# Patient Record
Sex: FEMALE | Race: WHITE | NOT HISPANIC OR LATINO | URBAN - METROPOLITAN AREA
[De-identification: names, ages, dates, MRNs, and addresses within clinical notes are randomized per-mention and may not be internally consistent; named-entity substitution may affect disease eponyms.]

---

## 2017-02-20 ENCOUNTER — INPATIENT (INPATIENT)
Facility: HOSPITAL | Age: 62
LOS: 4 days | Discharge: HOME | End: 2017-02-25
Attending: HOSPITALIST

## 2017-06-08 ENCOUNTER — INPATIENT (INPATIENT)
Facility: HOSPITAL | Age: 62
LOS: 8 days | Discharge: HOME | End: 2017-06-17

## 2017-06-08 DIAGNOSIS — R06.02 SHORTNESS OF BREATH: ICD-10-CM

## 2017-06-08 DIAGNOSIS — T50.901A POISONING BY UNSPECIFIED DRUGS, MEDICAMENTS AND BIOLOGICAL SUBSTANCES, ACCIDENTAL (UNINTENTIONAL), INITIAL ENCOUNTER: ICD-10-CM

## 2017-06-08 DIAGNOSIS — M62.82 RHABDOMYOLYSIS: ICD-10-CM

## 2017-06-08 DIAGNOSIS — R09.2 RESPIRATORY ARREST: ICD-10-CM

## 2017-06-28 DIAGNOSIS — N17.9 ACUTE KIDNEY FAILURE, UNSPECIFIED: ICD-10-CM

## 2017-06-28 DIAGNOSIS — F13.239 SEDATIVE, HYPNOTIC OR ANXIOLYTIC DEPENDENCE WITH WITHDRAWAL, UNSPECIFIED: ICD-10-CM

## 2017-06-28 DIAGNOSIS — T42.6X1A POISONING BY OTHER ANTIEPILEPTIC AND SEDATIVE-HYPNOTIC DRUGS, ACCIDENTAL (UNINTENTIONAL), INITIAL ENCOUNTER: ICD-10-CM

## 2017-06-28 DIAGNOSIS — F15.93 OTHER STIMULANT USE, UNSPECIFIED WITH WITHDRAWAL: ICD-10-CM

## 2017-06-28 DIAGNOSIS — J15.8 PNEUMONIA DUE TO OTHER SPECIFIED BACTERIA: ICD-10-CM

## 2017-06-28 DIAGNOSIS — F17.210 NICOTINE DEPENDENCE, CIGARETTES, UNCOMPLICATED: ICD-10-CM

## 2017-06-28 DIAGNOSIS — T39.1X1A POISONING BY 4-AMINOPHENOL DERIVATIVES, ACCIDENTAL (UNINTENTIONAL), INITIAL ENCOUNTER: ICD-10-CM

## 2017-06-28 DIAGNOSIS — I10 ESSENTIAL (PRIMARY) HYPERTENSION: ICD-10-CM

## 2017-06-28 DIAGNOSIS — M62.82 RHABDOMYOLYSIS: ICD-10-CM

## 2017-06-28 DIAGNOSIS — T65.91XA TOXIC EFFECT OF UNSPECIFIED SUBSTANCE, ACCIDENTAL (UNINTENTIONAL), INITIAL ENCOUNTER: ICD-10-CM

## 2017-06-28 DIAGNOSIS — M19.90 UNSPECIFIED OSTEOARTHRITIS, UNSPECIFIED SITE: ICD-10-CM

## 2017-06-28 DIAGNOSIS — J69.0 PNEUMONITIS DUE TO INHALATION OF FOOD AND VOMIT: ICD-10-CM

## 2017-06-28 DIAGNOSIS — J96.00 ACUTE RESPIRATORY FAILURE, UNSPECIFIED WHETHER WITH HYPOXIA OR HYPERCAPNIA: ICD-10-CM

## 2017-06-28 DIAGNOSIS — R56.9 UNSPECIFIED CONVULSIONS: ICD-10-CM
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
## 2017-06-28 DIAGNOSIS — T42.4X1A POISONING BY BENZODIAZEPINES, ACCIDENTAL (UNINTENTIONAL), INITIAL ENCOUNTER: ICD-10-CM

## 2017-06-28 DIAGNOSIS — E87.6 HYPOKALEMIA: ICD-10-CM

## 2017-09-04 ENCOUNTER — EMERGENCY (EMERGENCY)
Facility: HOSPITAL | Age: 62
LOS: 0 days | Discharge: HOME | End: 2017-09-04

## 2017-09-04 DIAGNOSIS — Z79.899 OTHER LONG TERM (CURRENT) DRUG THERAPY: ICD-10-CM
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
## 2017-09-04 DIAGNOSIS — S00.83XA CONTUSION OF OTHER PART OF HEAD, INITIAL ENCOUNTER: ICD-10-CM

## 2017-09-04 DIAGNOSIS — I10 ESSENTIAL (PRIMARY) HYPERTENSION: ICD-10-CM

## 2017-09-04 DIAGNOSIS — S30.1XXA CONTUSION OF ABDOMINAL WALL, INITIAL ENCOUNTER: ICD-10-CM
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
## 2017-09-04 DIAGNOSIS — M62.82 RHABDOMYOLYSIS: ICD-10-CM
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
## 2017-09-04 DIAGNOSIS — S20.212A CONTUSION OF LEFT FRONT WALL OF THORAX, INITIAL ENCOUNTER: ICD-10-CM

## 2017-09-04 DIAGNOSIS — R06.02 SHORTNESS OF BREATH: ICD-10-CM

## 2017-09-04 DIAGNOSIS — R09.2 RESPIRATORY ARREST: ICD-10-CM

## 2017-09-04 DIAGNOSIS — T50.901A POISONING BY UNSPECIFIED DRUGS, MEDICAMENTS AND BIOLOGICAL SUBSTANCES, ACCIDENTAL (UNINTENTIONAL), INITIAL ENCOUNTER: ICD-10-CM

## 2017-09-07 ENCOUNTER — EMERGENCY (EMERGENCY)
Facility: HOSPITAL | Age: 62
LOS: 0 days | Discharge: HOME | End: 2017-09-07

## 2017-09-07 DIAGNOSIS — Z79.899 OTHER LONG TERM (CURRENT) DRUG THERAPY: ICD-10-CM

## 2017-09-07 DIAGNOSIS — T50.901A POISONING BY UNSPECIFIED DRUGS, MEDICAMENTS AND BIOLOGICAL SUBSTANCES, ACCIDENTAL (UNINTENTIONAL), INITIAL ENCOUNTER: ICD-10-CM

## 2017-09-07 DIAGNOSIS — M62.82 RHABDOMYOLYSIS: ICD-10-CM

## 2017-09-07 DIAGNOSIS — R06.02 SHORTNESS OF BREATH: ICD-10-CM

## 2017-09-07 DIAGNOSIS — I10 ESSENTIAL (PRIMARY) HYPERTENSION: ICD-10-CM

## 2017-09-07 DIAGNOSIS — R10.30 LOWER ABDOMINAL PAIN, UNSPECIFIED: ICD-10-CM

## 2017-09-07 DIAGNOSIS — K57.92 DIVERTICULITIS OF INTESTINE, PART UNSPECIFIED, WITHOUT PERFORATION OR ABSCESS WITHOUT BLEEDING: ICD-10-CM

## 2017-09-07 DIAGNOSIS — Z90.49 ACQUIRED ABSENCE OF OTHER SPECIFIED PARTS OF DIGESTIVE TRACT: ICD-10-CM

## 2017-09-07 DIAGNOSIS — R09.2 RESPIRATORY ARREST: ICD-10-CM

## 2017-09-13 ENCOUNTER — EMERGENCY (EMERGENCY)
Facility: HOSPITAL | Age: 62
LOS: 0 days | Discharge: HOME | End: 2017-09-13

## 2017-09-13 DIAGNOSIS — R56.9 UNSPECIFIED CONVULSIONS: ICD-10-CM
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
## 2017-09-13 DIAGNOSIS — Z79.899 OTHER LONG TERM (CURRENT) DRUG THERAPY: ICD-10-CM

## 2017-09-13 DIAGNOSIS — R09.2 RESPIRATORY ARREST: ICD-10-CM

## 2017-09-13 DIAGNOSIS — M62.82 RHABDOMYOLYSIS: ICD-10-CM

## 2017-09-13 DIAGNOSIS — I10 ESSENTIAL (PRIMARY) HYPERTENSION: ICD-10-CM

## 2017-09-13 DIAGNOSIS — T50.901A POISONING BY UNSPECIFIED DRUGS, MEDICAMENTS AND BIOLOGICAL SUBSTANCES, ACCIDENTAL (UNINTENTIONAL), INITIAL ENCOUNTER: ICD-10-CM

## 2017-09-13 DIAGNOSIS — S22.42XA MULTIPLE FRACTURES OF RIBS, LEFT SIDE, INITIAL ENCOUNTER FOR CLOSED FRACTURE: ICD-10-CM

## 2017-09-13 DIAGNOSIS — R06.02 SHORTNESS OF BREATH: ICD-10-CM
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
## 2017-09-13 DIAGNOSIS — Z90.49 ACQUIRED ABSENCE OF OTHER SPECIFIED PARTS OF DIGESTIVE TRACT: ICD-10-CM

## 2017-09-13 DIAGNOSIS — D72.829 ELEVATED WHITE BLOOD CELL COUNT, UNSPECIFIED: ICD-10-CM

## 2017-09-13 DIAGNOSIS — R07.81 PLEURODYNIA: ICD-10-CM

## 2017-10-05 DIAGNOSIS — S42.102A FRACTURE OF UNSPECIFIED PART OF SCAPULA, LEFT SHOULDER, INITIAL ENCOUNTER FOR CLOSED FRACTURE: ICD-10-CM
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
## 2017-10-05 DIAGNOSIS — F14.10 COCAINE ABUSE, UNCOMPLICATED: ICD-10-CM
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
## 2017-10-05 DIAGNOSIS — R56.9 UNSPECIFIED CONVULSIONS: ICD-10-CM

## 2017-10-05 DIAGNOSIS — R91.1 SOLITARY PULMONARY NODULE: ICD-10-CM
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
## 2017-10-05 DIAGNOSIS — M62.82 RHABDOMYOLYSIS: ICD-10-CM

## 2017-10-05 DIAGNOSIS — R06.02 SHORTNESS OF BREATH: ICD-10-CM

## 2018-02-27 ENCOUNTER — APPOINTMENT (OUTPATIENT)
Dept: HEART AND VASCULAR | Facility: CLINIC | Age: 63
End: 2018-02-27
Payer: COMMERCIAL

## 2018-02-27 VITALS — HEART RATE: 68 BPM

## 2018-02-27 VITALS — DIASTOLIC BLOOD PRESSURE: 86 MMHG | SYSTOLIC BLOOD PRESSURE: 130 MMHG

## 2018-02-27 VITALS — WEIGHT: 176 LBS | HEIGHT: 67 IN | BODY MASS INDEX: 27.62 KG/M2

## 2018-02-27 DIAGNOSIS — I10 ESSENTIAL (PRIMARY) HYPERTENSION: ICD-10-CM

## 2018-02-27 DIAGNOSIS — Z82.49 FAMILY HISTORY OF ISCHEMIC HEART DISEASE AND OTHER DISEASES OF THE CIRCULATORY SYSTEM: ICD-10-CM

## 2018-02-27 DIAGNOSIS — R06.02 SHORTNESS OF BREATH: ICD-10-CM

## 2018-02-27 DIAGNOSIS — I36.1 NONRHEUMATIC TRICUSPID (VALVE) INSUFFICIENCY: ICD-10-CM

## 2018-02-27 PROCEDURE — 99244 OFF/OP CNSLTJ NEW/EST MOD 40: CPT | Mod: 25

## 2018-02-27 PROCEDURE — 93000 ELECTROCARDIOGRAM COMPLETE: CPT

## 2018-02-27 PROCEDURE — 36415 COLL VENOUS BLD VENIPUNCTURE: CPT

## 2018-02-27 PROCEDURE — 93306 TTE W/DOPPLER COMPLETE: CPT

## 2018-02-27 RX ORDER — NEBIVOLOL HYDROCHLORIDE 20 MG/1
20 TABLET ORAL
Qty: 90 | Refills: 0 | Status: ACTIVE | COMMUNITY

## 2018-02-28 LAB — NT-PROBNP SERPL-MCNC: 75 PG/ML

## 2018-03-20 ENCOUNTER — APPOINTMENT (OUTPATIENT)
Dept: CT IMAGING | Facility: HOSPITAL | Age: 63
End: 2018-03-20

## 2018-05-21 ENCOUNTER — RX RENEWAL (OUTPATIENT)
Age: 63
End: 2018-05-21

## 2018-06-05 ENCOUNTER — INPATIENT (INPATIENT)
Facility: HOSPITAL | Age: 63
LOS: 1 days | Discharge: HOME | End: 2018-06-07
Attending: HOSPITALIST | Admitting: HOSPITALIST

## 2018-06-05 VITALS
TEMPERATURE: 97 F | HEART RATE: 84 BPM | DIASTOLIC BLOOD PRESSURE: 68 MMHG | SYSTOLIC BLOOD PRESSURE: 143 MMHG | RESPIRATION RATE: 16 BRPM

## 2018-06-05 DIAGNOSIS — Z98.82 BREAST IMPLANT STATUS: Chronic | ICD-10-CM

## 2018-06-05 DIAGNOSIS — R56.9 UNSPECIFIED CONVULSIONS: ICD-10-CM

## 2018-06-05 DIAGNOSIS — S22.39XA FRACTURE OF ONE RIB, UNSPECIFIED SIDE, INITIAL ENCOUNTER FOR CLOSED FRACTURE: Chronic | ICD-10-CM

## 2018-06-05 DIAGNOSIS — Z90.49 ACQUIRED ABSENCE OF OTHER SPECIFIED PARTS OF DIGESTIVE TRACT: Chronic | ICD-10-CM

## 2018-06-05 DIAGNOSIS — S07.1XXS: Chronic | ICD-10-CM

## 2018-06-05 DIAGNOSIS — S22.39XA FRACTURE OF ONE RIB, UNSPECIFIED SIDE, INITIAL ENCOUNTER FOR CLOSED FRACTURE: ICD-10-CM

## 2018-06-05 DIAGNOSIS — I10 ESSENTIAL (PRIMARY) HYPERTENSION: ICD-10-CM

## 2018-06-05 LAB
ALBUMIN SERPL ELPH-MCNC: 4.1 G/DL — SIGNIFICANT CHANGE UP (ref 3.5–5.2)
ALBUMIN SERPL ELPH-MCNC: 4.2 G/DL — SIGNIFICANT CHANGE UP (ref 3.5–5.2)
ALP SERPL-CCNC: 54 U/L — SIGNIFICANT CHANGE UP (ref 30–115)
ALP SERPL-CCNC: 55 U/L — SIGNIFICANT CHANGE UP (ref 30–115)
ALT FLD-CCNC: 36 U/L — SIGNIFICANT CHANGE UP (ref 0–41)
ALT FLD-CCNC: 37 U/L — SIGNIFICANT CHANGE UP (ref 0–41)
ANION GAP SERPL CALC-SCNC: 13 MMOL/L — SIGNIFICANT CHANGE UP (ref 7–14)
ANION GAP SERPL CALC-SCNC: 13 MMOL/L — SIGNIFICANT CHANGE UP (ref 7–14)
AST SERPL-CCNC: 28 U/L — SIGNIFICANT CHANGE UP (ref 0–41)
AST SERPL-CCNC: 28 U/L — SIGNIFICANT CHANGE UP (ref 0–41)
BILIRUB SERPL-MCNC: 0.4 MG/DL — SIGNIFICANT CHANGE UP (ref 0.2–1.2)
BILIRUB SERPL-MCNC: 0.5 MG/DL — SIGNIFICANT CHANGE UP (ref 0.2–1.2)
BUN SERPL-MCNC: 10 MG/DL — SIGNIFICANT CHANGE UP (ref 10–20)
BUN SERPL-MCNC: 9 MG/DL — LOW (ref 10–20)
CALCIUM SERPL-MCNC: 10 MG/DL — SIGNIFICANT CHANGE UP (ref 8.5–10.1)
CALCIUM SERPL-MCNC: 9.3 MG/DL — SIGNIFICANT CHANGE UP (ref 8.5–10.1)
CHLORIDE SERPL-SCNC: 104 MMOL/L — SIGNIFICANT CHANGE UP (ref 98–110)
CHLORIDE SERPL-SCNC: 104 MMOL/L — SIGNIFICANT CHANGE UP (ref 98–110)
CO2 SERPL-SCNC: 26 MMOL/L — SIGNIFICANT CHANGE UP (ref 17–32)
CO2 SERPL-SCNC: 27 MMOL/L — SIGNIFICANT CHANGE UP (ref 17–32)
CREAT SERPL-MCNC: 0.7 MG/DL — SIGNIFICANT CHANGE UP (ref 0.7–1.5)
CREAT SERPL-MCNC: 0.7 MG/DL — SIGNIFICANT CHANGE UP (ref 0.7–1.5)
GLUCOSE SERPL-MCNC: 101 MG/DL — HIGH (ref 70–99)
GLUCOSE SERPL-MCNC: 187 MG/DL — HIGH (ref 70–99)
HCT VFR BLD CALC: 41.7 % — SIGNIFICANT CHANGE UP (ref 37–47)
HGB BLD-MCNC: 15 G/DL — SIGNIFICANT CHANGE UP (ref 12–16)
MAGNESIUM SERPL-MCNC: 2.1 MG/DL — SIGNIFICANT CHANGE UP (ref 1.8–2.4)
MAGNESIUM SERPL-MCNC: 2.3 MG/DL — SIGNIFICANT CHANGE UP (ref 1.8–2.4)
MCHC RBC-ENTMCNC: 36 G/DL — SIGNIFICANT CHANGE UP (ref 32–37)
MCHC RBC-ENTMCNC: 36.1 PG — HIGH (ref 27–31)
MCV RBC AUTO: 100.5 FL — HIGH (ref 81–99)
NRBC # BLD: 0 /100 WBCS — SIGNIFICANT CHANGE UP (ref 0–0)
PLATELET # BLD AUTO: 156 K/UL — SIGNIFICANT CHANGE UP (ref 130–400)
POTASSIUM SERPL-MCNC: 3.8 MMOL/L — SIGNIFICANT CHANGE UP (ref 3.5–5)
POTASSIUM SERPL-MCNC: 4.3 MMOL/L — SIGNIFICANT CHANGE UP (ref 3.5–5)
POTASSIUM SERPL-SCNC: 3.8 MMOL/L — SIGNIFICANT CHANGE UP (ref 3.5–5)
POTASSIUM SERPL-SCNC: 4.3 MMOL/L — SIGNIFICANT CHANGE UP (ref 3.5–5)
PROT SERPL-MCNC: 6.5 G/DL — SIGNIFICANT CHANGE UP (ref 6–8)
PROT SERPL-MCNC: 6.6 G/DL — SIGNIFICANT CHANGE UP (ref 6–8)
RBC # BLD: 4.15 M/UL — LOW (ref 4.2–5.4)
RBC # FLD: 13.2 % — SIGNIFICANT CHANGE UP (ref 11.5–14.5)
SODIUM SERPL-SCNC: 143 MMOL/L — SIGNIFICANT CHANGE UP (ref 135–146)
SODIUM SERPL-SCNC: 144 MMOL/L — SIGNIFICANT CHANGE UP (ref 135–146)
WBC # BLD: 6.74 K/UL — SIGNIFICANT CHANGE UP (ref 4.8–10.8)
WBC # FLD AUTO: 6.74 K/UL — SIGNIFICANT CHANGE UP (ref 4.8–10.8)

## 2018-06-05 RX ORDER — ENOXAPARIN SODIUM 100 MG/ML
40 INJECTION SUBCUTANEOUS EVERY 24 HOURS
Qty: 0 | Refills: 0 | Status: DISCONTINUED | OUTPATIENT
Start: 2018-06-05 | End: 2018-06-07

## 2018-06-05 RX ORDER — TEMAZEPAM 15 MG/1
15 CAPSULE ORAL AT BEDTIME
Qty: 0 | Refills: 0 | Status: DISCONTINUED | OUTPATIENT
Start: 2018-06-05 | End: 2018-06-07

## 2018-06-05 RX ORDER — AMLODIPINE BESYLATE 2.5 MG/1
5 TABLET ORAL DAILY
Qty: 0 | Refills: 0 | Status: DISCONTINUED | OUTPATIENT
Start: 2018-06-05 | End: 2018-06-07

## 2018-06-05 RX ORDER — LEVETIRACETAM 250 MG/1
750 TABLET, FILM COATED ORAL EVERY 12 HOURS
Qty: 0 | Refills: 0 | Status: DISCONTINUED | OUTPATIENT
Start: 2018-06-05 | End: 2018-06-06

## 2018-06-05 RX ORDER — FLUTICASONE PROPIONATE 50 MCG
2 SPRAY, SUSPENSION NASAL DAILY
Qty: 0 | Refills: 0 | Status: DISCONTINUED | OUTPATIENT
Start: 2018-06-05 | End: 2018-06-07

## 2018-06-05 RX ADMIN — LEVETIRACETAM 750 MILLIGRAM(S): 250 TABLET, FILM COATED ORAL at 22:44

## 2018-06-05 RX ADMIN — TEMAZEPAM 15 MILLIGRAM(S): 15 CAPSULE ORAL at 23:24

## 2018-06-05 NOTE — CONSULT NOTE ADULT - ATTENDING COMMENTS
Agree with the history and plan  She does have history of fall skull Fx on the left side.  with the potential for higher risk  she believes keppra is giving her side effects  she is here for risk assessment and to decide on the possible replacement for the keppra

## 2018-06-05 NOTE — H&P ADULT - ASSESSMENT
Patient with known history of head trauma and seizure was sent for direct admission for VEEG. She is seizure free for one year and takes Keppra 750 mg Q 12 hours. She wants to drive and to be tapered of Keppra. She denies any specific complaints at the time of admission. Sometimes she feels SOB after she suffered from rib fracture.   Patient was directly admitted to  for VEEG monitoring.

## 2018-06-05 NOTE — H&P ADULT - HISTORY OF PRESENT ILLNESS
Patient with known history of head trauma and seizure was sent for direct admission for VEEG. She is seizure free for one year and takes Keppra 750 mg Q 12 hours. She wants to drive and to be tapered of Keppra. She denies any specific complaints at the time of admission. Patient with known history of head trauma and seizure was sent for direct admission for VEEG. She is seizure free for one year and takes Keppra 750 mg Q 12 hours. She wants to drive and to be tapered of Keppra. She denies any specific complaints at the time of admission. Sometimes she feels SOB after she suffered from rib fracture. 61 y/o female  with known history of head trauma and seizure was sent for direct admission for VEEG. She is seizure free for one year and takes Keppra 750 mg Q 12 hours. She wants to drive and to be tapered of Keppra. She denies any specific complaints at the time of admission. Sometimes she feels SOB after she suffered from rib fracture. 63 y/o female  with known history of head trauma and seizure was sent for direct admission for VEEG. She is seizure free for one year and takes Keppra 750 mg Q 12 hours. She wants to drive and to be tapered off Keppra. Patient  denies any specific complaints at the time of admission. Sometimes she feels SOB after she suffered from rib fracture.

## 2018-06-05 NOTE — H&P ADULT - NSHPPHYSICALEXAM_GEN_ALL_CORE
PHYSICAL EXAM:      Constitutional: well build; good hygiene   Neck: supple ; no JVD   Breasts: b/l breast implants   Back: no paraspinal tenderness  Respiratory: CTA B/L   Cardiovascular: S1; S2  Gastrointestinal/GI: soft ; NT; ND; BS positive   Extremities: no edema ; pulses intact   Neurological: AAO x 3 ; no focal neurological deficient   Skin: dry skin ; no rash     Vital Signs Last 24 Hrs  T(C): 36.1 (05 Jun 2018 14:13), Max: 36.1 (05 Jun 2018 10:25)  T(F): 97 (05 Jun 2018 14:13), Max: 97 (05 Jun 2018 14:13)  HR: 88 (05 Jun 2018 14:13) (84 - 88)  BP: 141/83 (05 Jun 2018 14:13) (141/83 - 143/68)  BP(mean): --  RR: 16 (05 Jun 2018 14:13) (16 - 16)

## 2018-06-05 NOTE — CONSULT NOTE ADULT - SUBJECTIVE AND OBJECTIVE BOX
Neurology/Epilepsy Consult:    CORIE CHOI 62y Female  MRN-[id number]    Patient is a 62y old right-handed Female who presents for elective VEEG      HPI: History is obtained from patient and EMR.   Patient had 1st seizure in 2017. At that time she was admitted with left shoulder pain and rhabdomyolysis s/p seizure witnessed by spouse. Patient admitted to using cocaine a few days earlier, was not started on AED. In 2017 patient was found unresponsive at home, then had a seizure witnessed by EMT. Patient was intubated, admitted to ICU with rhabdomyolysis and aspiration pneumonia, started on Keppra. Patient denies any other events. Reports being compliant with Keppra, but c/o hair loss and significant bloating in the last year. Patient also wants to drive.  Patient c/o difficulty falling asleep/staying asleep. Was tried on Ambien but did not find it  effective. Patient uses Xanax 2mg at times, last use 2 days ago (not prescribed). States used Xanax 6 times in the last 3-4 weeks. Also, patient tripped and fell last week, and had left foot pain. Was using Vicodin as needed, last use 2 days ago (not prescribed). Patient reports last use of ETOH 2 days ago (2-3 glasses of wine). Patient has h/o alcohol abuse in the past. Lately uses ETOH rarely, and denies having more than 2 drinks every few weeks.     PAST MEDICAL & SURGICAL HISTORY:  Fall with skull fracture   Seizures  HTN  Osteopenia  Breast augmentation  Appendectomy  Polysubstance abuse      FAMILY HISTORY:  No seizures      Allergy:  No Known Allergies      Home Medications:  Keppra 750mg q12hrs  Norvasc 5mg q24hrs  Xanax 2mg (not prescribed) - last use 2 days ago  Vicodin (not prescribed0 - last use 2 days ago      MEDICATIONS  (STANDING):  enoxaparin Injectable 40 milliGRAM(s) SubCutaneous every 24 hours  levETIRAcetam 750 milliGRAM(s) Oral every 12 hours    MEDICATIONS  (PRN):  LORazepam   Injectable 2 milliGRAM(s) IV Push three times a day PRN generalized tonic-clonic seizure lasting longer than 2 minutes, or two consecutive seizures without return to baseline in-between      T(F): 97 (18 @ 14:13), Max: 97 (18 @ 14:13)  HR: 88 (18 @ 14:13) (84 - 88)  BP: 141/83 (18 @ 14:13) (141/83 - 143/68)  RR: 16 (18 @ 14:13) (16 - 16)  SpO2: --    Neurologic Examination:  General:  Appearance is consistent with chronologic age.  No abnormal facies.   General: The patient is oriented to person, place, time and date.  Follows 3-4-step directions. Slightly fidgety. Language with normal repetition, comprehension and naming.  Nondysarthric.    Cranial nerves: EOMI w/o nystagmus, skew or reported double vision.  PERRL.  No ptosis/weakness of eyelid closure.  Facial sensation is normal with normal bite.  No facial asymmetry.  Hearing grossly intact b/l.  Palate elevates midline.  Tongue midline.  Motor examination:   Normal tone, bulk and range of motion.  No tenderness, twitching, tremors or involuntary movements.  Formal Muscle Strength Testin/5 UE; 5/5 LE.  No observable drift.  Reflexes:   2+ b/l pectoralis, biceps, triceps, brachioradialis, patella and Achilles.  Plantar response downgoing b/l.    Sensory examination:   Intact to light touch and pinprick, pain.  Cerebellum:   FTN/HKS intact with normal JENAE in all limbs.  No dysmetria or dysdiadokinesia.  Gait narrow based and normal.      Labs:   2018 B12 505, Folate 17.2, Vit D 40  Keppra 21.0 mcg/ml (random level)      Neuroimaging:  [facility] 2017 - normal      Ambulatory EE2017 - small number of bilateral independent R>L mid-Temporal spikes and after going slow waves  VEEG x 48hrs 2017 - mild right hemispheric focal slowing, mainly over F-T region  VEEG x 48hrs 2017 borderline to mild L>R anterior quadrant focal slowing      Assessment:  This is a 62y Female with h/o skull Fx, seizures, osteopenia, remains clinically seizure-free for almost a year. Patient reports hair loss and bloating since starting Keppra.    Discussed with Dr. Cevallos    Plan:   - VEEG for characterization and assessment  - Seizure precautions  - Continue home dose of Keppra for now (ordered)  - Ativan 2mg IV PRN for generalized tonic-clonic seizure lasting longer than 2 minutes, or two consecutive seizures without return to baseline in-between (ordered)  - CBC, CMP, Mg, Keppra level trough, urine toxicology (ordered)  - Keep Mg above 2    Plan discussed with patient in details, all questions answered  18 @ 15:23

## 2018-06-06 DIAGNOSIS — R73.9 HYPERGLYCEMIA, UNSPECIFIED: ICD-10-CM

## 2018-06-06 LAB
AMPHET UR-MCNC: NEGATIVE — SIGNIFICANT CHANGE UP
BARBITURATES UR SCN-MCNC: NEGATIVE — SIGNIFICANT CHANGE UP
BENZODIAZ UR-MCNC: POSITIVE
COCAINE METAB.OTHER UR-MCNC: NEGATIVE — SIGNIFICANT CHANGE UP
METHADONE UR-MCNC: NEGATIVE — SIGNIFICANT CHANGE UP
OPIATES UR-MCNC: NEGATIVE — SIGNIFICANT CHANGE UP
PCP SPEC-MCNC: SIGNIFICANT CHANGE UP
PROPOXYPHENE QUALITATIVE URINE RESULT: NEGATIVE — SIGNIFICANT CHANGE UP

## 2018-06-06 RX ORDER — LEVETIRACETAM 250 MG/1
375 TABLET, FILM COATED ORAL EVERY 12 HOURS
Qty: 0 | Refills: 0 | Status: DISCONTINUED | OUTPATIENT
Start: 2018-06-06 | End: 2018-06-07

## 2018-06-06 RX ORDER — LANOLIN ALCOHOL/MO/W.PET/CERES
5 CREAM (GRAM) TOPICAL AT BEDTIME
Qty: 0 | Refills: 0 | Status: DISCONTINUED | OUTPATIENT
Start: 2018-06-06 | End: 2018-06-07

## 2018-06-06 RX ORDER — OXCARBAZEPINE 300 MG/1
150 TABLET, FILM COATED ORAL EVERY 12 HOURS
Qty: 0 | Refills: 0 | Status: DISCONTINUED | OUTPATIENT
Start: 2018-06-06 | End: 2018-06-07

## 2018-06-06 RX ADMIN — Medication 2 SPRAY(S): at 16:14

## 2018-06-06 RX ADMIN — OXCARBAZEPINE 150 MILLIGRAM(S): 300 TABLET, FILM COATED ORAL at 21:36

## 2018-06-06 RX ADMIN — Medication 5 MILLIGRAM(S): at 21:36

## 2018-06-06 RX ADMIN — AMLODIPINE BESYLATE 5 MILLIGRAM(S): 2.5 TABLET ORAL at 05:17

## 2018-06-06 RX ADMIN — ENOXAPARIN SODIUM 40 MILLIGRAM(S): 100 INJECTION SUBCUTANEOUS at 21:58

## 2018-06-06 RX ADMIN — LEVETIRACETAM 750 MILLIGRAM(S): 250 TABLET, FILM COATED ORAL at 09:13

## 2018-06-06 RX ADMIN — LEVETIRACETAM 375 MILLIGRAM(S): 250 TABLET, FILM COATED ORAL at 21:37

## 2018-06-06 NOTE — PROGRESS NOTE ADULT - SUBJECTIVE AND OBJECTIVE BOX
Patient is a 62y old  Female who presents with a chief complaint of patient was [facility] to the [facility] [place] [facility] (05 Jun 2018 14:59)  Today patient is comfortable and denies any specific complaints     Vital Signs Last 24 Hrs  T(C): 36.1 (06 Jun 2018 05:26), Max: 36.1 (05 Jun 2018 14:13)  T(F): 97 (06 Jun 2018 05:26), Max: 97 (05 Jun 2018 14:13)  HR: 74 (06 Jun 2018 05:26) (74 - 88)  BP: 129/79 (06 Jun 2018 05:26) (122/93 - 141/83)  BP(mean): --  RR: 16 (06 Jun 2018 05:26) (16 - 16)    PHYSICAL EXAM:  GENERAL: NAD, well-groomed, well-developed  HEAD:  Atraumatic, Normocephalic  EYES: EOMI, PERRLA, conjunctiva and sclera clear  ENMT: No tonsillar erythema, exudates, or enlargement; Moist mucous membranes, Good dentition, No lesions  NECK: Supple, No JVD, Normal thyroid  NERVOUS SYSTEM:  Alert & Oriented X3, Good concentration; Motor Strength 5/5 B/L upper and lower extremities; DTRs 2+ intact and symmetric  CHEST/LUNG: Clear to percussion bilaterally; No rales, rhonchi, wheezing, or rubs  HEART: Regular rate and rhythm; No murmurs, rubs, or gallops  ABDOMEN: Soft, Nontender, Nondistended; Bowel sounds present  EXTREMITIES:  2+ Peripheral Pulses, No clubbing, cyanosis, or edema  LYMPH: No lymphadenopathy noted  SKIN: No rashes or lesions      LABS:                        15.0   6.74  )-----------( 156      ( 05 Jun 2018 19:50 )             41.7     06-05    143  |  104  |  9<L>  ----------------------------<  187<H>  3.8   |  26  |  0.7    Ca    9.3      05 Jun 2018 19:50  Mg     2.1     06-05    TPro  6.5  /  Alb  4.1  /  TBili  0.5  /  DBili  x   /  AST  28  /  ALT  36  /  AlkPhos  55  06-05      RADIOLOGY & ADDITIONAL TESTS: NONE   MEDICATIONS  (STANDING):  amLODIPine   Tablet 5 milliGRAM(s) Oral daily  enoxaparin Injectable 40 milliGRAM(s) SubCutaneous every 24 hours  fluticasone propionate 50 MICROgram(s)/spray Nasal Spray 2 Spray(s) Both Nostrils daily  levETIRAcetam 750 milliGRAM(s) Oral every 12 hours  melatonin 5 milliGRAM(s) Oral at bedtime    MEDICATIONS  (PRN):  LORazepam   Injectable 2 milliGRAM(s) IV Push three times a day PRN generalized tonic-clonic seizure lasting longer than 2 minutes, or two consecutive seizures without return to baseline in-between  temazepam 15 milliGRAM(s) Oral at bedtime PRN Insomnia

## 2018-06-06 NOTE — PROGRESS NOTE ADULT - SUBJECTIVE AND OBJECTIVE BOX
Epilepsy Attending Note:     CORIE CHOI    62y Female  MRN [id number]    Vital Signs Last 24 Hrs  T(C): 36.1 (2018 05:26), Max: 36.1 (2018 14:13)  T(F): 97 (2018 05:26), Max: 97 (2018 14:13)  HR: 74 (2018 05:26) (74 - 88)  BP: 129/79 (2018 05:26) (122/93 - 141/83)  BP(mean): --  RR: 16 (2018 05:26) (16 - 16)  SpO2: --                          15.0   6.74  )-----------( 156      ( 2018 19:50 )             41.7           143  |  104  |  9<L>  ----------------------------<  187<H>  3.8   |  26  |  0.7    Ca    9.3      2018 19:50  Mg     2.1         TPro  6.5  /  Alb  4.1  /  TBili  0.5  /  DBili  x   /  AST  28  /  ALT  36  /  AlkPhos  55        MEDICATIONS  (STANDING):  amLODIPine   Tablet 5 milliGRAM(s) Oral daily  enoxaparin Injectable 40 milliGRAM(s) SubCutaneous every 24 hours  fluticasone propionate 50 MICROgram(s)/spray Nasal Spray 2 Spray(s) Both Nostrils daily  levETIRAcetam 750 milliGRAM(s) Oral every 12 hours  melatonin 5 milliGRAM(s) Oral at bedtime    MEDICATIONS  (PRN):  LORazepam   Injectable 2 milliGRAM(s) IV Push three times a day PRN generalized tonic-clonic seizure lasting longer than 2 minutes, or two consecutive seizures without return to baseline in-between  temazepam 15 milliGRAM(s) Oral at bedtime PRN Insomnia            VEEG in the last 24 hours:    Background-------8-9 hz    Focal and generalized slowing---no generalized slowing, borderline left hemispheric more prominently over the left temporal region    Interictal activity-----left mid-posterior temporal sharps /sharp transients    Events-----none    Seizures---none    Impression:  abnormal due to the above, (potential for partial seizure from the left hemisphere)    Plan - findings discussed, if stayed will  the keppra by 50% as of tonight and start trileptal 150 bid

## 2018-06-06 NOTE — PROGRESS NOTE ADULT - ASSESSMENT
Patient had 1st seizure in February 2017. At that time she was admitted with left shoulder pain and rhabdomyolysis s/p seizure witnessed by spouse. Patient admitted to using cocaine a few days earlier, was not started on AED. In June 2017 patient was found unresponsive at home, then had a seizure witnessed by EMT. Patient was intubated, admitted to ICU with rhabdomyolysis and aspiration pneumonia, started on Keppra. Patient denies any other events. Reports being compliant with Keppra, but c/o hair loss and significant bloating in the last year. Patient also wants to drive.  Patient c/o difficulty falling asleep/staying asleep. Was tried on Ambien but did not find it  effective. Patient uses Xanax 2mg at times, last use 2 days ago (not prescribed). States used Xanax 6 times in the last 3-4 weeks. Also, patient tripped and fell last week, and had left foot pain. Was using Vicodin as needed, last use 2 days ago (not prescribed). Patient reports last use of ETOH 2 days ago (2-3 glasses of wine). Patient has h/o alcohol abuse in the past. Lately uses ETOH rarely, and denies having more than 2 drinks every few weeks.  Pt was sent to the hospital for VEEG which is currently in progress.

## 2018-06-07 ENCOUNTER — TRANSCRIPTION ENCOUNTER (OUTPATIENT)
Age: 63
End: 2018-06-07

## 2018-06-07 VITALS
TEMPERATURE: 98 F | SYSTOLIC BLOOD PRESSURE: 140 MMHG | DIASTOLIC BLOOD PRESSURE: 82 MMHG | RESPIRATION RATE: 14 BRPM | HEART RATE: 73 BPM

## 2018-06-07 LAB
ALBUMIN SERPL ELPH-MCNC: 4.8 G/DL — SIGNIFICANT CHANGE UP (ref 3.5–5.2)
ALP SERPL-CCNC: 55 U/L — SIGNIFICANT CHANGE UP (ref 30–115)
ALT FLD-CCNC: 33 U/L — SIGNIFICANT CHANGE UP (ref 0–41)
ANION GAP SERPL CALC-SCNC: 14 MMOL/L — SIGNIFICANT CHANGE UP (ref 7–14)
AST SERPL-CCNC: 25 U/L — SIGNIFICANT CHANGE UP (ref 0–41)
BILIRUB SERPL-MCNC: 0.7 MG/DL — SIGNIFICANT CHANGE UP (ref 0.2–1.2)
BUN SERPL-MCNC: 11 MG/DL — SIGNIFICANT CHANGE UP (ref 10–20)
CALCIUM SERPL-MCNC: 9.4 MG/DL — SIGNIFICANT CHANGE UP (ref 8.5–10.1)
CHLORIDE SERPL-SCNC: 100 MMOL/L — SIGNIFICANT CHANGE UP (ref 98–110)
CO2 SERPL-SCNC: 26 MMOL/L — SIGNIFICANT CHANGE UP (ref 17–32)
CREAT SERPL-MCNC: 0.8 MG/DL — SIGNIFICANT CHANGE UP (ref 0.7–1.5)
GLUCOSE SERPL-MCNC: 92 MG/DL — SIGNIFICANT CHANGE UP (ref 70–99)
POTASSIUM SERPL-MCNC: 4.2 MMOL/L — SIGNIFICANT CHANGE UP (ref 3.5–5)
POTASSIUM SERPL-SCNC: 4.2 MMOL/L — SIGNIFICANT CHANGE UP (ref 3.5–5)
PROT SERPL-MCNC: 6.9 G/DL — SIGNIFICANT CHANGE UP (ref 6–8)
SODIUM SERPL-SCNC: 140 MMOL/L — SIGNIFICANT CHANGE UP (ref 135–146)
T3 SERPL-MCNC: 95 NG/DL — SIGNIFICANT CHANGE UP (ref 80–200)
T4 AB SER-ACNC: 5.3 UG/DL — SIGNIFICANT CHANGE UP (ref 4.6–12)
TSH SERPL-MCNC: 0.58 UIU/ML — SIGNIFICANT CHANGE UP (ref 0.27–4.2)

## 2018-06-07 RX ORDER — LEVETIRACETAM 250 MG/1
1 TABLET, FILM COATED ORAL
Qty: 0 | Refills: 0 | COMMUNITY

## 2018-06-07 RX ORDER — AMLODIPINE BESYLATE 2.5 MG/1
1 TABLET ORAL
Qty: 0 | Refills: 0 | COMMUNITY

## 2018-06-07 RX ORDER — OXCARBAZEPINE 300 MG/1
1 TABLET, FILM COATED ORAL
Qty: 90 | Refills: 3 | OUTPATIENT
Start: 2018-06-07 | End: 2018-10-04

## 2018-06-07 RX ORDER — LEVETIRACETAM 250 MG/1
250 TABLET, FILM COATED ORAL EVERY 12 HOURS
Qty: 0 | Refills: 0 | Status: DISCONTINUED | OUTPATIENT
Start: 2018-06-07 | End: 2018-06-07

## 2018-06-07 RX ORDER — FLUTICASONE PROPIONATE 50 MCG
2 SPRAY, SUSPENSION NASAL
Qty: 0 | Refills: 0 | COMMUNITY
Start: 2018-06-07

## 2018-06-07 RX ORDER — LANOLIN ALCOHOL/MO/W.PET/CERES
1 CREAM (GRAM) TOPICAL
Qty: 0 | Refills: 0 | COMMUNITY
Start: 2018-06-07

## 2018-06-07 RX ORDER — LEVETIRACETAM 250 MG/1
1 TABLET, FILM COATED ORAL
Qty: 60 | Refills: 2 | OUTPATIENT
Start: 2018-06-07 | End: 2018-09-04

## 2018-06-07 RX ADMIN — OXCARBAZEPINE 150 MILLIGRAM(S): 300 TABLET, FILM COATED ORAL at 10:31

## 2018-06-07 RX ADMIN — AMLODIPINE BESYLATE 5 MILLIGRAM(S): 2.5 TABLET ORAL at 05:15

## 2018-06-07 RX ADMIN — LEVETIRACETAM 250 MILLIGRAM(S): 250 TABLET, FILM COATED ORAL at 10:31

## 2018-06-07 NOTE — PROGRESS NOTE ADULT - SUBJECTIVE AND OBJECTIVE BOX
Epilepsy Attending Note:     CORIE CHOI    62y Female  MRN [id number]    Vital Signs Last 24 Hrs  T(C): 36.1 (07 Jun 2018 05:21), Max: 36.6 (06 Jun 2018 22:15)  T(F): 97 (07 Jun 2018 05:21), Max: 97.9 (06 Jun 2018 22:15)  HR: 70 (07 Jun 2018 05:21) (70 - 84)  BP: 125/70 (07 Jun 2018 05:21) (125/70 - 150/79)  BP(mean): --  RR: 16 (07 Jun 2018 05:21) (16 - 16)  SpO2: --                          15.0   6.74  )-----------( 156      ( 05 Jun 2018 19:50 )             41.7       06-07    140  |  100  |  11  ----------------------------<  92  4.2   |  26  |  0.8    Ca    9.4      07 Jun 2018 06:40  Mg     2.1     06-05    TPro  6.9  /  Alb  4.8  /  TBili  0.7  /  DBili  x   /  AST  25  /  ALT  33  /  AlkPhos  55  06-07      MEDICATIONS  (STANDING):  amLODIPine   Tablet 5 milliGRAM(s) Oral daily  enoxaparin Injectable 40 milliGRAM(s) SubCutaneous every 24 hours  fluticasone propionate 50 MICROgram(s)/spray Nasal Spray 2 Spray(s) Both Nostrils daily  levETIRAcetam 250 milliGRAM(s) Oral every 12 hours  melatonin 5 milliGRAM(s) Oral at bedtime  OXcarbazepine 150 milliGRAM(s) Oral every 12 hours    MEDICATIONS  (PRN):  LORazepam   Injectable 2 milliGRAM(s) IV Push three times a day PRN generalized tonic-clonic seizure lasting longer than 2 minutes, or two consecutive seizures without return to baseline in-between  temazepam 15 milliGRAM(s) Oral at bedtime PRN Insomnia            VEEG in the last 24 hours:    Background---------8-9 Hz    Focal and generalized slowing----bilateral left >> right focal slowing    Interictal activity----left sharps that appear epileptiform in nature    Events-----none    Seizures-------none    Impression:-------abnormal due to the above    Plan - Dc the monitoring           Dc on current doses.          Increase TLP to 1 1/2 of 150 mg in 3 days           continue keppra at 250 bid           levels in 1-2 weeks           F/U with neurology in 3-4 weeks

## 2018-06-07 NOTE — PROGRESS NOTE ADULT - SUBJECTIVE AND OBJECTIVE BOX
Neurology/Epilepsy:    Follow up neurology appointment is scheduled with Dr. Leavitt  for June 22, 2018 at 11 am    [facility] [facility], [place] [address]  [phone]    Rx for Keppra and Trileptal sent ot the pharmacy.  Also given Rx for blood work to be done prior to follow up.    Information is given to the patient, all questions answered.    06-07-18 @ 11:32

## 2018-06-07 NOTE — DISCHARGE NOTE ADULT - CARE PROVIDER_API CALL
Alan Steward), EEGEpilepsy; Neurology  [facility]  [address] [place] [address]  Phone: [phone]  Fax: [phone]

## 2018-06-07 NOTE — DISCHARGE NOTE ADULT - PATIENT PORTAL LINK FT
You can access the [facility], offered by [facility], by registering with the following website: [email or web address]

## 2018-06-07 NOTE — DISCHARGE NOTE ADULT - MEDICATION SUMMARY - MEDICATIONS TO STOP TAKING
I will STOP taking the medications listed below when I get home from the hospital:    Keppra 750 mg oral tablet  -- 1 tab(s) by mouth 2 times a day

## 2018-06-07 NOTE — DISCHARGE NOTE ADULT - HOSPITAL COURSE
Patient had 1st seizure in February 2017. At that time she was admitted with left shoulder pain and rhabdomyolysis s/p seizure witnessed by spouse. Patient admitted to using cocaine a few days earlier, was not started on AED. In June 2017 patient was found unresponsive at home, then had a seizure witnessed by EMT. Patient was intubated, admitted to ICU with rhabdomyolysis and aspiration pneumonia, started on Keppra. Patient denies any other events. Reports being compliant with Keppra, but c/o hair loss and significant bloating in the last year. Patient also wanted  to drive.  Patient c/o difficulty falling asleep/staying asleep. Was tried on Ambien but did not find it  effective. Patient uses Xanax 2mg at times, last use 2 days ago (not prescribed). States used Xanax 6 times in the last 3-4 weeks. Also, patient tripped and fell last week, and had left foot pain. Was using Vicodin as needed, last use 2 days ago (not prescribed). Patient reports last use of ETOH 2 days ago (2-3 glasses of wine). Patient has h/o alcohol abuse in the past. Lately uses ETOH rarely, and denies having more than 2 drinks every few weeks. Pt was admitted for VEEG which was completed and pt was cleared for discharge by neurology

## 2018-06-07 NOTE — DISCHARGE NOTE ADULT - CARE PLAN
Principal Discharge DX:	Seizure  Goal:	maintain seizure free  Assessment and plan of treatment:	take all meds as prescribed; f/u with neurology after discharge; DO NOT DRIVE until cleared by neurology  Secondary Diagnosis:	Crushing injury of skull, sequela  Assessment and plan of treatment:	prevent falls  Secondary Diagnosis:	Hypertension, unspecified type  Assessment and plan of treatment:	comply with diet; take all meds as prescribed  Secondary Diagnosis:	Rib fracture  Assessment and plan of treatment:	prevent falls

## 2018-06-07 NOTE — DISCHARGE NOTE ADULT - PLAN OF CARE
maintain seizure free take all meds as prescribed; f/u with neurology after discharge; DO NOT DRIVE until cleared by neurology prevent falls comply with diet; take all meds as prescribed

## 2018-06-07 NOTE — DISCHARGE NOTE ADULT - MEDICATION SUMMARY - MEDICATIONS TO TAKE
I will START or STAY ON the medications listed below when I get home from the hospital:    Keppra 250 mg oral tablet  -- 1 tab(s) by mouth every 12 hours x 30 days   -- Check with your doctor before becoming pregnant.  It is very important that you take or use this exactly as directed.  Do not skip doses or discontinue unless directed by your doctor.  May cause drowsiness or dizziness.  Obtain medical advice before taking any non-prescription drugs as some may affect the action of this medication.  Swallow whole.  Do not crush.  This drug may impair the ability to drive or operate machinery.  Use care until you become familiar with its effects.    -- Indication: For Seizure    Trileptal 150 mg oral tablet  -- take one tablet every 12 hours for 3 days, then increase to one and a half tablet every 12 hours  -- It is very important that you take or use this exactly as directed.  Do not skip doses or discontinue unless directed by your doctor.  May cause drowsiness.  Alcohol may intensify this effect.  Use care when operating dangerous machinery.    -- Indication: For Seizure    Norvasc 5 mg oral tablet  -- 1 tab(s) by mouth once a day  -- Indication: For Hypertension, unspecified type    fluticasone 50 mcg/inh nasal spray  -- 2 spray(s) into nose once a day  -- Indication: For nasal congestion    melatonin 5 mg oral tablet  -- 1 tab(s) by mouth once a day (at bedtime)  -- Indication: For insomnia

## 2018-06-08 LAB — LEVETIRACETAM SERPL-MCNC: 10.5 MCG/ML — LOW (ref 12–46)

## 2018-06-09 LAB — ZINC SERPL-MCNC: 60 UG/DL — SIGNIFICANT CHANGE UP (ref 56–134)

## 2018-06-12 DIAGNOSIS — S02.91XS UNSPECIFIED FRACTURE OF SKULL, SEQUELA: ICD-10-CM

## 2018-06-12 DIAGNOSIS — R07.81 PLEURODYNIA: ICD-10-CM

## 2018-06-12 DIAGNOSIS — Z98.82 BREAST IMPLANT STATUS: ICD-10-CM

## 2018-06-12 DIAGNOSIS — G40.909 EPILEPSY, UNSPECIFIED, NOT INTRACTABLE, WITHOUT STATUS EPILEPTICUS: ICD-10-CM

## 2018-06-12 DIAGNOSIS — Z87.81 PERSONAL HISTORY OF (HEALED) TRAUMATIC FRACTURE: ICD-10-CM

## 2018-06-12 DIAGNOSIS — R73.9 HYPERGLYCEMIA, UNSPECIFIED: ICD-10-CM

## 2018-06-12 DIAGNOSIS — I10 ESSENTIAL (PRIMARY) HYPERTENSION: ICD-10-CM

## 2018-08-23 ENCOUNTER — RX RENEWAL (OUTPATIENT)
Age: 63
End: 2018-08-23

## 2018-09-06 ENCOUNTER — APPOINTMENT (OUTPATIENT)
Dept: OTOLARYNGOLOGY | Facility: CLINIC | Age: 63
End: 2018-09-06

## 2018-09-12 NOTE — H&P ADULT - PSH
Breast implant status    Crushing injury of skull, sequela    History of appendectomy    Rib fractures The patient is a 19y Male complaining of

## 2018-10-04 ENCOUNTER — INPATIENT (INPATIENT)
Facility: HOSPITAL | Age: 63
LOS: 1 days | Discharge: HOME | End: 2018-10-06
Attending: SURGERY | Admitting: SURGERY
Payer: COMMERCIAL

## 2018-10-04 VITALS
OXYGEN SATURATION: 100 % | DIASTOLIC BLOOD PRESSURE: 66 MMHG | TEMPERATURE: 96 F | SYSTOLIC BLOOD PRESSURE: 117 MMHG | HEART RATE: 93 BPM

## 2018-10-04 DIAGNOSIS — Z98.82 BREAST IMPLANT STATUS: Chronic | ICD-10-CM

## 2018-10-04 DIAGNOSIS — I10 ESSENTIAL (PRIMARY) HYPERTENSION: ICD-10-CM

## 2018-10-04 DIAGNOSIS — R56.9 UNSPECIFIED CONVULSIONS: ICD-10-CM

## 2018-10-04 DIAGNOSIS — Z90.49 ACQUIRED ABSENCE OF OTHER SPECIFIED PARTS OF DIGESTIVE TRACT: Chronic | ICD-10-CM

## 2018-10-04 DIAGNOSIS — S07.1XXS: Chronic | ICD-10-CM

## 2018-10-04 DIAGNOSIS — S22.39XA FRACTURE OF ONE RIB, UNSPECIFIED SIDE, INITIAL ENCOUNTER FOR CLOSED FRACTURE: Chronic | ICD-10-CM

## 2018-10-04 LAB
ALBUMIN SERPL ELPH-MCNC: 4.6 G/DL — SIGNIFICANT CHANGE UP (ref 3.5–5.2)
ALBUMIN SERPL ELPH-MCNC: 4.9 G/DL — SIGNIFICANT CHANGE UP (ref 3.5–5.2)
ALBUMIN SERPL ELPH-MCNC: 5.5 G/DL — HIGH (ref 3.5–5.2)
ALP SERPL-CCNC: 57 U/L — SIGNIFICANT CHANGE UP (ref 30–115)
ALP SERPL-CCNC: 64 U/L — SIGNIFICANT CHANGE UP (ref 30–115)
ALP SERPL-CCNC: 67 U/L — SIGNIFICANT CHANGE UP (ref 30–115)
ALT FLD-CCNC: 67 U/L — HIGH (ref 0–41)
ALT FLD-CCNC: 71 U/L — HIGH (ref 0–41)
ALT FLD-CCNC: 87 U/L — HIGH (ref 0–41)
ANION GAP SERPL CALC-SCNC: 18 MMOL/L — HIGH (ref 7–14)
ANION GAP SERPL CALC-SCNC: 19 MMOL/L — HIGH (ref 7–14)
ANION GAP SERPL CALC-SCNC: 33 MMOL/L — HIGH (ref 7–14)
APAP SERPL-MCNC: <5 UG/ML — LOW (ref 10–30)
APPEARANCE UR: CLEAR — SIGNIFICANT CHANGE UP
AST SERPL-CCNC: 60 U/L — HIGH (ref 0–41)
AST SERPL-CCNC: 61 U/L — HIGH (ref 0–41)
AST SERPL-CCNC: 87 U/L — HIGH (ref 0–41)
BACTERIA # UR AUTO: ABNORMAL
BASE EXCESS BLDV CALC-SCNC: -6.5 MMOL/L — LOW (ref -2–2)
BASOPHILS # BLD AUTO: 0.05 K/UL — SIGNIFICANT CHANGE UP (ref 0–0.2)
BASOPHILS NFR BLD AUTO: 0.2 % — SIGNIFICANT CHANGE UP (ref 0–1)
BILIRUB SERPL-MCNC: 0.5 MG/DL — SIGNIFICANT CHANGE UP (ref 0.2–1.2)
BILIRUB SERPL-MCNC: 0.5 MG/DL — SIGNIFICANT CHANGE UP (ref 0.2–1.2)
BILIRUB SERPL-MCNC: <0.2 MG/DL — SIGNIFICANT CHANGE UP (ref 0.2–1.2)
BILIRUB UR-MCNC: NEGATIVE — SIGNIFICANT CHANGE UP
BUN SERPL-MCNC: 15 MG/DL — SIGNIFICANT CHANGE UP (ref 10–20)
BUN SERPL-MCNC: 15 MG/DL — SIGNIFICANT CHANGE UP (ref 10–20)
BUN SERPL-MCNC: 16 MG/DL — SIGNIFICANT CHANGE UP (ref 10–20)
CA-I SERPL-SCNC: 1.18 MMOL/L — SIGNIFICANT CHANGE UP (ref 1.12–1.3)
CALCIUM SERPL-MCNC: 10.4 MG/DL — HIGH (ref 8.5–10.1)
CALCIUM SERPL-MCNC: 9 MG/DL — SIGNIFICANT CHANGE UP (ref 8.5–10.1)
CALCIUM SERPL-MCNC: 9 MG/DL — SIGNIFICANT CHANGE UP (ref 8.5–10.1)
CHLORIDE SERPL-SCNC: 91 MMOL/L — LOW (ref 98–110)
CHLORIDE SERPL-SCNC: 95 MMOL/L — LOW (ref 98–110)
CHLORIDE SERPL-SCNC: 96 MMOL/L — LOW (ref 98–110)
CK MB CFR SERPL CALC: 15.6 NG/ML — HIGH (ref 0.6–6.3)
CK SERPL-CCNC: 1936 U/L — HIGH (ref 0–225)
CO2 SERPL-SCNC: 11 MMOL/L — LOW (ref 17–32)
CO2 SERPL-SCNC: 18 MMOL/L — SIGNIFICANT CHANGE UP (ref 17–32)
CO2 SERPL-SCNC: 18 MMOL/L — SIGNIFICANT CHANGE UP (ref 17–32)
COD CRY URNS QL: NEGATIVE — SIGNIFICANT CHANGE UP
COLOR SPEC: YELLOW — SIGNIFICANT CHANGE UP
CREAT SERPL-MCNC: 1.1 MG/DL — SIGNIFICANT CHANGE UP (ref 0.7–1.5)
CREAT SERPL-MCNC: 1.2 MG/DL — SIGNIFICANT CHANGE UP (ref 0.7–1.5)
CREAT SERPL-MCNC: 1.4 MG/DL — SIGNIFICANT CHANGE UP (ref 0.7–1.5)
DIFF PNL FLD: ABNORMAL
EOSINOPHIL # BLD AUTO: 0 K/UL — SIGNIFICANT CHANGE UP (ref 0–0.7)
EOSINOPHIL NFR BLD AUTO: 0 % — SIGNIFICANT CHANGE UP (ref 0–8)
EPI CELLS # UR: ABNORMAL /HPF
ETHANOL SERPL-MCNC: <10 MG/DL — HIGH
GAS PNL BLDV: 130 MMOL/L — LOW (ref 136–145)
GAS PNL BLDV: SIGNIFICANT CHANGE UP
GLUCOSE SERPL-MCNC: 100 MG/DL — HIGH (ref 70–99)
GLUCOSE SERPL-MCNC: 130 MG/DL — HIGH (ref 70–99)
GLUCOSE SERPL-MCNC: 84 MG/DL — SIGNIFICANT CHANGE UP (ref 70–99)
GLUCOSE UR QL: 100 MG/DL
GRAN CASTS # UR COMP ASSIST: ABNORMAL /LPF
HCO3 BLDV-SCNC: 21 MMOL/L — LOW (ref 22–29)
HCT VFR BLD CALC: 42.6 % — SIGNIFICANT CHANGE UP (ref 37–47)
HCT VFR BLD CALC: 44.1 % — SIGNIFICANT CHANGE UP (ref 37–47)
HCT VFR BLD CALC: 48.6 % — HIGH (ref 37–47)
HCT VFR BLDA CALC: 47.4 % — HIGH (ref 34–44)
HGB BLD CALC-MCNC: 15.5 G/DL — SIGNIFICANT CHANGE UP (ref 14–18)
HGB BLD-MCNC: 15.2 G/DL — SIGNIFICANT CHANGE UP (ref 12–16)
HGB BLD-MCNC: 15.6 G/DL — SIGNIFICANT CHANGE UP (ref 12–16)
HGB BLD-MCNC: 16.7 G/DL — HIGH (ref 12–16)
HOROWITZ INDEX BLDV+IHG-RTO: 21 — SIGNIFICANT CHANGE UP
HYALINE CASTS # UR AUTO: NEGATIVE — SIGNIFICANT CHANGE UP
IMM GRANULOCYTES NFR BLD AUTO: 0.9 % — HIGH (ref 0.1–0.3)
KETONES UR-MCNC: NEGATIVE — SIGNIFICANT CHANGE UP
LACTATE BLDV-MCNC: 3.3 MMOL/L — HIGH (ref 0.5–1.6)
LACTATE SERPL-SCNC: 3.8 MMOL/L — HIGH (ref 0.5–2.2)
LEUKOCYTE ESTERASE UR-ACNC: NEGATIVE — SIGNIFICANT CHANGE UP
LYMPHOCYTES # BLD AUTO: 1.17 K/UL — LOW (ref 1.2–3.4)
LYMPHOCYTES # BLD AUTO: 3.6 % — LOW (ref 20.5–51.1)
MAGNESIUM SERPL-MCNC: 3.5 MG/DL — CRITICAL HIGH (ref 1.8–2.4)
MCHC RBC-ENTMCNC: 33.7 PG — HIGH (ref 27–31)
MCHC RBC-ENTMCNC: 34.1 PG — HIGH (ref 27–31)
MCHC RBC-ENTMCNC: 34.4 G/DL — SIGNIFICANT CHANGE UP (ref 32–37)
MCHC RBC-ENTMCNC: 34.4 PG — HIGH (ref 27–31)
MCHC RBC-ENTMCNC: 35.4 G/DL — SIGNIFICANT CHANGE UP (ref 32–37)
MCHC RBC-ENTMCNC: 35.7 G/DL — SIGNIFICANT CHANGE UP (ref 32–37)
MCV RBC AUTO: 96.3 FL — SIGNIFICANT CHANGE UP (ref 81–99)
MCV RBC AUTO: 96.4 FL — SIGNIFICANT CHANGE UP (ref 81–99)
MCV RBC AUTO: 98.2 FL — SIGNIFICANT CHANGE UP (ref 81–99)
MONOCYTES # BLD AUTO: 1.64 K/UL — HIGH (ref 0.1–0.6)
MONOCYTES NFR BLD AUTO: 5.1 % — SIGNIFICANT CHANGE UP (ref 1.7–9.3)
NEUTROPHILS # BLD AUTO: 29.32 K/UL — HIGH (ref 1.4–6.5)
NEUTROPHILS NFR BLD AUTO: 90.2 % — HIGH (ref 42.2–75.2)
NEUTS BAND # BLD: 3 % — SIGNIFICANT CHANGE UP (ref 0–6)
NITRITE UR-MCNC: NEGATIVE — SIGNIFICANT CHANGE UP
NRBC # BLD: 0 /100 WBCS — SIGNIFICANT CHANGE UP (ref 0–0)
NRBC # BLD: 0 /100 — SIGNIFICANT CHANGE UP (ref 0–0)
PCO2 BLDV: 48 MMHG — SIGNIFICANT CHANGE UP (ref 41–51)
PH BLDV: 7.25 — LOW (ref 7.26–7.43)
PH UR: 5.5 — SIGNIFICANT CHANGE UP (ref 5–8)
PLAT MORPH BLD: NORMAL — SIGNIFICANT CHANGE UP
PLATELET # BLD AUTO: 223 K/UL — SIGNIFICANT CHANGE UP (ref 130–400)
PLATELET # BLD AUTO: 234 K/UL — SIGNIFICANT CHANGE UP (ref 130–400)
PLATELET # BLD AUTO: 292 K/UL — SIGNIFICANT CHANGE UP (ref 130–400)
PO2 BLDV: 26 MMHG — SIGNIFICANT CHANGE UP (ref 20–40)
POTASSIUM BLDV-SCNC: 5.5 MMOL/L — SIGNIFICANT CHANGE UP (ref 3.3–5.6)
POTASSIUM SERPL-MCNC: 5.4 MMOL/L — HIGH (ref 3.5–5)
POTASSIUM SERPL-MCNC: 5.6 MMOL/L — HIGH (ref 3.5–5)
POTASSIUM SERPL-MCNC: 5.9 MMOL/L — HIGH (ref 3.5–5)
POTASSIUM SERPL-SCNC: 5.4 MMOL/L — HIGH (ref 3.5–5)
POTASSIUM SERPL-SCNC: 5.6 MMOL/L — HIGH (ref 3.5–5)
POTASSIUM SERPL-SCNC: 5.9 MMOL/L — HIGH (ref 3.5–5)
PROT SERPL-MCNC: 7 G/DL — SIGNIFICANT CHANGE UP (ref 6–8)
PROT SERPL-MCNC: 7.6 G/DL — SIGNIFICANT CHANGE UP (ref 6–8)
PROT SERPL-MCNC: 8.5 G/DL — HIGH (ref 6–8)
PROT UR-MCNC: 100 MG/DL
RBC # BLD: 4.42 M/UL — SIGNIFICANT CHANGE UP (ref 4.2–5.4)
RBC # BLD: 4.58 M/UL — SIGNIFICANT CHANGE UP (ref 4.2–5.4)
RBC # BLD: 4.95 M/UL — SIGNIFICANT CHANGE UP (ref 4.2–5.4)
RBC # FLD: 11.8 % — SIGNIFICANT CHANGE UP (ref 11.5–14.5)
RBC # FLD: 11.8 % — SIGNIFICANT CHANGE UP (ref 11.5–14.5)
RBC # FLD: 11.9 % — SIGNIFICANT CHANGE UP (ref 11.5–14.5)
RBC BLD AUTO: NORMAL — SIGNIFICANT CHANGE UP
RBC CASTS # UR COMP ASSIST: ABNORMAL /HPF
SALICYLATES SERPL-MCNC: <0.3 MG/DL — LOW (ref 4–30)
SAO2 % BLDV: 40 % — SIGNIFICANT CHANGE UP
SODIUM SERPL-SCNC: 131 MMOL/L — LOW (ref 135–146)
SODIUM SERPL-SCNC: 133 MMOL/L — LOW (ref 135–146)
SODIUM SERPL-SCNC: 135 MMOL/L — SIGNIFICANT CHANGE UP (ref 135–146)
SP GR SPEC: >=1.03 (ref 1.01–1.03)
TRI-PHOS CRY UR QL COMP ASSIST: NEGATIVE — SIGNIFICANT CHANGE UP
TROPONIN T SERPL-MCNC: 0.02 NG/ML — HIGH
TROPONIN T SERPL-MCNC: 0.03 NG/ML — CRITICAL HIGH
TROPONIN T SERPL-MCNC: 0.03 NG/ML — CRITICAL HIGH
URATE CRY FLD QL MICRO: ABNORMAL /HPF
UROBILINOGEN FLD QL: 0.2 MG/DL — SIGNIFICANT CHANGE UP (ref 0.2–0.2)
WBC # BLD: 25.23 K/UL — HIGH (ref 4.8–10.8)
WBC # BLD: 26.93 K/UL — HIGH (ref 4.8–10.8)
WBC # BLD: 32.46 K/UL — HIGH (ref 4.8–10.8)
WBC # FLD AUTO: 25.23 K/UL — HIGH (ref 4.8–10.8)
WBC # FLD AUTO: 26.93 K/UL — HIGH (ref 4.8–10.8)
WBC # FLD AUTO: 32.46 K/UL — HIGH (ref 4.8–10.8)
WBC UR QL: SIGNIFICANT CHANGE UP /HPF

## 2018-10-04 RX ORDER — SODIUM CHLORIDE 9 MG/ML
1000 INJECTION INTRAMUSCULAR; INTRAVENOUS; SUBCUTANEOUS
Qty: 0 | Refills: 0 | Status: DISCONTINUED | OUTPATIENT
Start: 2018-10-04 | End: 2018-10-05

## 2018-10-04 RX ORDER — PROPOFOL 10 MG/ML
5 INJECTION, EMULSION INTRAVENOUS
Qty: 1000 | Refills: 0 | Status: DISCONTINUED | OUTPATIENT
Start: 2018-10-04 | End: 2018-10-05

## 2018-10-04 RX ORDER — HEPARIN SODIUM 5000 [USP'U]/ML
5000 INJECTION INTRAVENOUS; SUBCUTANEOUS EVERY 12 HOURS
Qty: 0 | Refills: 0 | Status: DISCONTINUED | OUTPATIENT
Start: 2018-10-04 | End: 2018-10-05

## 2018-10-04 RX ORDER — ETOMIDATE 2 MG/ML
20 INJECTION INTRAVENOUS ONCE
Qty: 0 | Refills: 0 | Status: COMPLETED | OUTPATIENT
Start: 2018-10-04 | End: 2018-10-04

## 2018-10-04 RX ORDER — PANTOPRAZOLE SODIUM 20 MG/1
40 TABLET, DELAYED RELEASE ORAL DAILY
Qty: 0 | Refills: 0 | Status: DISCONTINUED | OUTPATIENT
Start: 2018-10-04 | End: 2018-10-05

## 2018-10-04 RX ORDER — ROCURONIUM BROMIDE 10 MG/ML
100 VIAL (ML) INTRAVENOUS ONCE
Qty: 0 | Refills: 0 | Status: COMPLETED | OUTPATIENT
Start: 2018-10-04 | End: 2018-10-04

## 2018-10-04 RX ORDER — MIDAZOLAM HYDROCHLORIDE 1 MG/ML
5 INJECTION, SOLUTION INTRAMUSCULAR; INTRAVENOUS ONCE
Qty: 0 | Refills: 0 | Status: DISCONTINUED | OUTPATIENT
Start: 2018-10-04 | End: 2018-10-04

## 2018-10-04 RX ORDER — OXCARBAZEPINE 300 MG/1
150 TABLET, FILM COATED ORAL
Qty: 0 | Refills: 0 | Status: DISCONTINUED | OUTPATIENT
Start: 2018-10-04 | End: 2018-10-06

## 2018-10-04 RX ORDER — SODIUM CHLORIDE 9 MG/ML
2500 INJECTION INTRAMUSCULAR; INTRAVENOUS; SUBCUTANEOUS ONCE
Qty: 0 | Refills: 0 | Status: COMPLETED | OUTPATIENT
Start: 2018-10-04 | End: 2018-10-04

## 2018-10-04 RX ORDER — LEVETIRACETAM 250 MG/1
500 TABLET, FILM COATED ORAL EVERY 12 HOURS
Qty: 0 | Refills: 0 | Status: DISCONTINUED | OUTPATIENT
Start: 2018-10-04 | End: 2018-10-06

## 2018-10-04 RX ADMIN — MIDAZOLAM HYDROCHLORIDE 5 MILLIGRAM(S): 1 INJECTION, SOLUTION INTRAMUSCULAR; INTRAVENOUS at 20:43

## 2018-10-04 RX ADMIN — SODIUM CHLORIDE 2500 MILLILITER(S): 9 INJECTION INTRAMUSCULAR; INTRAVENOUS; SUBCUTANEOUS at 21:03

## 2018-10-04 RX ADMIN — PROPOFOL 2.18 MICROGRAM(S)/KG/MIN: 10 INJECTION, EMULSION INTRAVENOUS at 19:45

## 2018-10-04 RX ADMIN — ETOMIDATE 20 MILLIGRAM(S): 2 INJECTION INTRAVENOUS at 18:30

## 2018-10-04 RX ADMIN — Medication 100 MILLIGRAM(S): at 18:30

## 2018-10-04 RX ADMIN — SODIUM CHLORIDE 100 MILLILITER(S): 9 INJECTION INTRAMUSCULAR; INTRAVENOUS; SUBCUTANEOUS at 23:59

## 2018-10-04 NOTE — ED PROCEDURE NOTE - CPROC ED GASTRIC INTUB DETAIL1
The orogastric tube (see size above) was inserted via the anatomic location./Placement was confirmed by aspiration of gastric secretions./Gastric tube connected to low continuous suction.

## 2018-10-04 NOTE — ED PROVIDER NOTE - ATTENDING CONTRIBUTION TO CARE
This is a 62yoF who presents via EMS for seizure.  Pt has known sz disorder (AEDs switched 1mo ago due to pt discomfort) and was intubated last year for resp failure in the setting of seizures.  Pt was in her usual state of health this morning aside from feeling tired/unable to sleep overnight x 2 days.   returned this evening and found her on the ground, postictal, w/ a forehead abrasion and was concerned that she had a seizure. EMS felt she was postictal still on their arrival.  She had a tonic-clonic seizure en route to the ED before ever regaining full consciousness. EMS gave 5mg versed as per their clinical protocol and pt stopped seizing but was unresponsive.    On ED arrival, pt unresponsive, no corneals or gag but sluggishly reactive pupils.  VSS.  Abrasion to anterior forehead, no further gross injuries noted.  2nd IV obtained, 1L IV NS started, labs obtained.  Pt intubated given status epilepticus and inability to protect airway given GCS<8. OGT placed.  Labs, CXR, CTH/c-spine/C/A/P obtained.  ICU consulted for admission.

## 2018-10-04 NOTE — ED PROCEDURE NOTE - CPROC ED TRACHE INTUB DETAIL1
Patient connected to ventilator with settings as ordered./Patient was pre-oxygenated. An endotracheal tube (ETT) was placed through the vocal cords into the trachea.  ETT position was confirmed by auscultation of bilateral breath sounds to all lung fields. ETCO2 level was appropriate.

## 2018-10-04 NOTE — ED PROCEDURE NOTE - ATTENDING CONTRIBUTION TO CARE
OGT placed for intubated pt, confirmed via return of bilious/gastric contents, auscultation of gastric sounds and xray.
Pt intubated for airway protection given severely dec mental status after valium for status epilepticus.  Intubation using CMAC successful on first attempt. Post-procedure O2 sat 100%. ETT confirmed via direct visualization, b/l breath sounds, condensation in tube and EtCO2 color change. CXR obtained to confirm ok depth of placement.

## 2018-10-04 NOTE — ED PROVIDER NOTE - OBJECTIVE STATEMENT
63 yo F with PMHx of HTN and seizure disorder presents to the ED BIB EMS for evaluation of altered mental status. Pt was at baseline this morning and when  returned home at night he found pt on the ground post ictal and was concerned she had a seizure. When EMS arrived pt was still post-ictal and en-route to ED pt had a tonic-clonic seizure and received 5 mg of Versed. In ED pt unresponsive.

## 2018-10-04 NOTE — CONSULT NOTE ADULT - SUBJECTIVE AND OBJECTIVE BOX
Patient is a 62y old  Female who presents with a chief complaint of found on floor by       REVIEW OF SYSTEMS  General: intuabted     Allergies  No Known Allergies    T(F): 96.3 (10-04-18 @ 18:23), Max: 96.3 (10-04-18 @ 18:23)  HR: 92 (10-04-18 @ 19:45)  BP: 145/74 (10-04-18 @ 19:45)  RR: 18 (10-04-18 @ 19:45)  SpO2: 99% (10-04-18 @ 19:45) (99% - 100%)      PHYSICAL EXAM:  GENERAL: intuabted   HEAD: right upper head ecchymosis   EYES: dialted - reactive   CHEST/LUNG: Clear to percussion bilaterally; No rales, rhonchi, wheezing, or rubs  HEART: Regular rate and rhythm; No murmurs, rubs, or gallops  ABDOMEN: Soft, Nontender, Nondistended; Bowel sounds present  EXTREMITIES:  2+ Peripheral Pulses, No clubbing, cyanosis, or edema    labs                              16.7   32.46 )-----------( 292      ( 04 Oct 2018 19:20 )             48.6           Mode: Auto Mode: PRVC/ Volume Support  RR (machine): 18  TV (machine): 450  FiO2: 60  PEEP: 5  MAP: 9  PIP: 18      radiology    propofol Infusion 5 MICROgram(s)/kG/Min IV Continuous <Continuous>

## 2018-10-04 NOTE — ED ADULT NURSE NOTE - INTERVENTIONS DEFINITIONS
Physically safe environment: no spills, clutter or unnecessary equipment/Monitor for mental status changes and reorient to person, place, and time

## 2018-10-04 NOTE — ED PROVIDER NOTE - PROGRESS NOTE DETAILS
Received from Dr Klerman. 62f w hx of seizure disorder and anticonvulsants. Found down and then had seizure w EMS and given midazolam 5mg presenting obtunded. Pt intubated on arrival. ICU already consulted. CT head & labs pending. Case d/w Intensivist and suspicion that WBC and temperature reactive from seizures. No indication for abx yet. Will add on blood cx and repeat labs at this time. Discussed case with Dr. Smallwood, aware of transfer. Discussed case with Dr. Schultz, aware of transfer-states to make pt trauma alert. Discussed case with ortho resident, will see pt at [facility]. patient arrived from [facility], tube placement confirmed by ETCO2, O2 sat at 100%, trauma alert called, ortho and trauma teams at bedside. will continue to assess and admit. fever noted, will give tylenol and abx given WBC. Per Dr. Anders, admit SICU. fever noted. though thought to be reactive, will give tylenol and abx given WBC. Surgery team aware. Per Dr. Anders, admit SICU. fever noted. though thought to be reactive, will give tylenol and abx given WBC. Surgery team aware. Option of LP discussed with them, they will speak to ICU team. Per Dr. Anders, admit SICU.

## 2018-10-04 NOTE — ED PROVIDER NOTE - PHYSICAL EXAMINATION
VITAL SIGNS: I have reviewed nursing notes and confirm.  CONSTITUTIONAL: Well-developed; well-nourished; in severe distress.   SKIN: Skin exam is warm and dry, no acute rash.  HEAD: Normocephalic. (+) abrasion to anterior forehead.  EYES: Pupils 3mm minimally reactive. Conjunctiva and sclera clear.  ENT: No nasal discharge; airway clear.   CARD: S1, S2 normal; no murmurs, gallops, or rubs. Regular rate and rhythm.  RESP: Bradypneic.   ABD: Normal bowel sounds; soft; non-distended.  EXT: No clubbing, cyanosis or edema.  NEURO: GCS 3.

## 2018-10-04 NOTE — ED ADULT NURSE NOTE - CHIEF COMPLAINT QUOTE
"She has a history of seizures. She had a seizure. She was given 5 mg Versed IV by EMS." Pt has history of intubation s/p seizures.

## 2018-10-04 NOTE — ED PROVIDER NOTE - CARE PLAN
Principal Discharge DX:	Altered mental status  Secondary Diagnosis:	Closed head injury  Secondary Diagnosis:	Seizure

## 2018-10-05 PROBLEM — R56.9 UNSPECIFIED CONVULSIONS: Chronic | Status: ACTIVE | Noted: 2018-06-05

## 2018-10-05 PROBLEM — I10 ESSENTIAL (PRIMARY) HYPERTENSION: Chronic | Status: ACTIVE | Noted: 2018-06-05

## 2018-10-05 LAB
AMPHET UR-MCNC: NEGATIVE — SIGNIFICANT CHANGE UP
ANION GAP SERPL CALC-SCNC: 17 MMOL/L — HIGH (ref 7–14)
ANION GAP SERPL CALC-SCNC: 22 MMOL/L — HIGH (ref 7–14)
APPEARANCE UR: ABNORMAL
APTT BLD: 30.5 SEC — SIGNIFICANT CHANGE UP (ref 27–39.2)
BACTERIA # UR AUTO: ABNORMAL /HPF
BARBITURATES UR SCN-MCNC: NEGATIVE — SIGNIFICANT CHANGE UP
BASOPHILS # BLD AUTO: 0.01 K/UL — SIGNIFICANT CHANGE UP (ref 0–0.2)
BASOPHILS NFR BLD AUTO: 0.1 % — SIGNIFICANT CHANGE UP (ref 0–1)
BENZODIAZ UR-MCNC: POSITIVE
BILIRUB UR-MCNC: NEGATIVE — SIGNIFICANT CHANGE UP
BLD GP AB SCN SERPL QL: SIGNIFICANT CHANGE UP
BUN SERPL-MCNC: 20 MG/DL — SIGNIFICANT CHANGE UP (ref 10–20)
BUN SERPL-MCNC: 25 MG/DL — HIGH (ref 10–20)
CALCIUM SERPL-MCNC: 8.9 MG/DL — SIGNIFICANT CHANGE UP (ref 8.5–10.1)
CALCIUM SERPL-MCNC: 9 MG/DL — SIGNIFICANT CHANGE UP (ref 8.5–10.1)
CHLORIDE SERPL-SCNC: 102 MMOL/L — SIGNIFICANT CHANGE UP (ref 98–110)
CHLORIDE SERPL-SCNC: 97 MMOL/L — LOW (ref 98–110)
CK SERPL-CCNC: 3707 U/L — HIGH (ref 0–225)
CK SERPL-CCNC: 3822 U/L — HIGH (ref 0–225)
CO2 SERPL-SCNC: 16 MMOL/L — LOW (ref 17–32)
CO2 SERPL-SCNC: 18 MMOL/L — SIGNIFICANT CHANGE UP (ref 17–32)
COCAINE METAB.OTHER UR-MCNC: NEGATIVE — SIGNIFICANT CHANGE UP
COLOR SPEC: YELLOW — SIGNIFICANT CHANGE UP
CREAT SERPL-MCNC: 1.6 MG/DL — HIGH (ref 0.7–1.5)
CREAT SERPL-MCNC: 1.7 MG/DL — HIGH (ref 0.7–1.5)
DIFF PNL FLD: ABNORMAL
DRUG SCREEN 1, URINE RESULT: SIGNIFICANT CHANGE UP
EOSINOPHIL # BLD AUTO: 0 K/UL — SIGNIFICANT CHANGE UP (ref 0–0.7)
EOSINOPHIL NFR BLD AUTO: 0 % — SIGNIFICANT CHANGE UP (ref 0–8)
EPI CELLS # UR: ABNORMAL /HPF
GLUCOSE SERPL-MCNC: 123 MG/DL — HIGH (ref 70–99)
GLUCOSE SERPL-MCNC: 138 MG/DL — HIGH (ref 70–99)
GLUCOSE UR QL: NEGATIVE — SIGNIFICANT CHANGE UP
GRAM STN FLD: SIGNIFICANT CHANGE UP
HCT VFR BLD CALC: 37.2 % — SIGNIFICANT CHANGE UP (ref 37–47)
HCT VFR BLD CALC: 40.1 % — SIGNIFICANT CHANGE UP (ref 37–47)
HGB BLD-MCNC: 13.1 G/DL — SIGNIFICANT CHANGE UP (ref 12–16)
HGB BLD-MCNC: 14 G/DL — SIGNIFICANT CHANGE UP (ref 12–16)
IMM GRANULOCYTES NFR BLD AUTO: 0.6 % — HIGH (ref 0.1–0.3)
INR BLD: 1.07 RATIO — SIGNIFICANT CHANGE UP (ref 0.65–1.3)
KETONES UR-MCNC: NEGATIVE — SIGNIFICANT CHANGE UP
LEUKOCYTE ESTERASE UR-ACNC: ABNORMAL
LYMPHOCYTES # BLD AUTO: 11.4 % — LOW (ref 20.5–51.1)
LYMPHOCYTES # BLD AUTO: 2.16 K/UL — SIGNIFICANT CHANGE UP (ref 1.2–3.4)
MCHC RBC-ENTMCNC: 33.6 PG — HIGH (ref 27–31)
MCHC RBC-ENTMCNC: 33.9 PG — HIGH (ref 27–31)
MCHC RBC-ENTMCNC: 34.9 G/DL — SIGNIFICANT CHANGE UP (ref 32–37)
MCHC RBC-ENTMCNC: 35.2 G/DL — SIGNIFICANT CHANGE UP (ref 32–37)
MCV RBC AUTO: 96.2 FL — SIGNIFICANT CHANGE UP (ref 81–99)
MCV RBC AUTO: 96.4 FL — SIGNIFICANT CHANGE UP (ref 81–99)
METHADONE UR-MCNC: NEGATIVE — SIGNIFICANT CHANGE UP
MONOCYTES # BLD AUTO: 2.31 K/UL — HIGH (ref 0.1–0.6)
MONOCYTES NFR BLD AUTO: 12.2 % — HIGH (ref 1.7–9.3)
NEUTROPHILS # BLD AUTO: 14.34 K/UL — HIGH (ref 1.4–6.5)
NEUTROPHILS NFR BLD AUTO: 75.7 % — HIGH (ref 42.2–75.2)
NITRITE UR-MCNC: NEGATIVE — SIGNIFICANT CHANGE UP
NRBC # BLD: 0 /100 WBCS — SIGNIFICANT CHANGE UP (ref 0–0)
NRBC # BLD: 0 /100 WBCS — SIGNIFICANT CHANGE UP (ref 0–0)
OPIATES UR-MCNC: NEGATIVE — SIGNIFICANT CHANGE UP
PCP UR-MCNC: NEGATIVE — SIGNIFICANT CHANGE UP
PH UR: 6 — SIGNIFICANT CHANGE UP (ref 5–8)
PLATELET # BLD AUTO: 190 K/UL — SIGNIFICANT CHANGE UP (ref 130–400)
PLATELET # BLD AUTO: 211 K/UL — SIGNIFICANT CHANGE UP (ref 130–400)
POTASSIUM SERPL-MCNC: 4.5 MMOL/L — SIGNIFICANT CHANGE UP (ref 3.5–5)
POTASSIUM SERPL-MCNC: 5 MMOL/L — SIGNIFICANT CHANGE UP (ref 3.5–5)
POTASSIUM SERPL-SCNC: 4.5 MMOL/L — SIGNIFICANT CHANGE UP (ref 3.5–5)
POTASSIUM SERPL-SCNC: 5 MMOL/L — SIGNIFICANT CHANGE UP (ref 3.5–5)
PROPOXYPHENE QUALITATIVE URINE RESULT: NEGATIVE — SIGNIFICANT CHANGE UP
PROT UR-MCNC: 100
PROTHROM AB SERPL-ACNC: 11.6 SEC — SIGNIFICANT CHANGE UP (ref 9.95–12.87)
RBC # BLD: 3.86 M/UL — LOW (ref 4.2–5.4)
RBC # BLD: 4.17 M/UL — LOW (ref 4.2–5.4)
RBC # FLD: 11.9 % — SIGNIFICANT CHANGE UP (ref 11.5–14.5)
RBC # FLD: 11.9 % — SIGNIFICANT CHANGE UP (ref 11.5–14.5)
RBC CASTS # UR COMP ASSIST: ABNORMAL /HPF
SODIUM SERPL-SCNC: 135 MMOL/L — SIGNIFICANT CHANGE UP (ref 135–146)
SODIUM SERPL-SCNC: 137 MMOL/L — SIGNIFICANT CHANGE UP (ref 135–146)
SP GR SPEC: >=1.03 — SIGNIFICANT CHANGE UP (ref 1.01–1.03)
SPECIMEN SOURCE: SIGNIFICANT CHANGE UP
THC UR QL: NEGATIVE — SIGNIFICANT CHANGE UP
TYPE + AB SCN PNL BLD: SIGNIFICANT CHANGE UP
URATE CRY FLD QL MICRO: ABNORMAL /HPF
UROBILINOGEN FLD QL: 0.2 — SIGNIFICANT CHANGE UP (ref 0.2–0.2)
WBC # BLD: 16.85 K/UL — HIGH (ref 4.8–10.8)
WBC # BLD: 18.93 K/UL — HIGH (ref 4.8–10.8)
WBC # FLD AUTO: 16.85 K/UL — HIGH (ref 4.8–10.8)
WBC # FLD AUTO: 18.93 K/UL — HIGH (ref 4.8–10.8)
WBC UR QL: ABNORMAL /HPF

## 2018-10-05 PROCEDURE — 99223 1ST HOSP IP/OBS HIGH 75: CPT

## 2018-10-05 RX ORDER — AMLODIPINE BESYLATE 2.5 MG/1
5 TABLET ORAL DAILY
Qty: 0 | Refills: 0 | Status: DISCONTINUED | OUTPATIENT
Start: 2018-10-05 | End: 2018-10-06

## 2018-10-05 RX ORDER — PANTOPRAZOLE SODIUM 20 MG/1
40 TABLET, DELAYED RELEASE ORAL DAILY
Qty: 0 | Refills: 0 | Status: DISCONTINUED | OUTPATIENT
Start: 2018-10-05 | End: 2018-10-05

## 2018-10-05 RX ORDER — SODIUM CHLORIDE 9 MG/ML
1000 INJECTION INTRAMUSCULAR; INTRAVENOUS; SUBCUTANEOUS
Qty: 0 | Refills: 0 | Status: DISCONTINUED | OUTPATIENT
Start: 2018-10-05 | End: 2018-10-06

## 2018-10-05 RX ORDER — CEFTRIAXONE 500 MG/1
1 INJECTION, POWDER, FOR SOLUTION INTRAMUSCULAR; INTRAVENOUS EVERY 24 HOURS
Qty: 0 | Refills: 0 | Status: DISCONTINUED | OUTPATIENT
Start: 2018-10-05 | End: 2018-10-06

## 2018-10-05 RX ORDER — CEFTRIAXONE 500 MG/1
1 INJECTION, POWDER, FOR SOLUTION INTRAMUSCULAR; INTRAVENOUS ONCE
Qty: 0 | Refills: 0 | Status: COMPLETED | OUTPATIENT
Start: 2018-10-05 | End: 2018-10-05

## 2018-10-05 RX ORDER — ACETAMINOPHEN 500 MG
650 TABLET ORAL ONCE
Qty: 0 | Refills: 0 | Status: COMPLETED | OUTPATIENT
Start: 2018-10-05 | End: 2018-10-05

## 2018-10-05 RX ORDER — THIAMINE MONONITRATE (VIT B1) 100 MG
100 TABLET ORAL DAILY
Qty: 0 | Refills: 0 | Status: DISCONTINUED | OUTPATIENT
Start: 2018-10-05 | End: 2018-10-06

## 2018-10-05 RX ORDER — HEPARIN SODIUM 5000 [USP'U]/ML
5000 INJECTION INTRAVENOUS; SUBCUTANEOUS EVERY 8 HOURS
Qty: 0 | Refills: 0 | Status: DISCONTINUED | OUTPATIENT
Start: 2018-10-05 | End: 2018-10-06

## 2018-10-05 RX ORDER — CEFTRIAXONE 500 MG/1
INJECTION, POWDER, FOR SOLUTION INTRAMUSCULAR; INTRAVENOUS
Qty: 0 | Refills: 0 | Status: DISCONTINUED | OUTPATIENT
Start: 2018-10-05 | End: 2018-10-05

## 2018-10-05 RX ORDER — CEFTRIAXONE 500 MG/1
1 INJECTION, POWDER, FOR SOLUTION INTRAMUSCULAR; INTRAVENOUS ONCE
Qty: 0 | Refills: 0 | Status: DISCONTINUED | OUTPATIENT
Start: 2018-10-05 | End: 2018-10-05

## 2018-10-05 RX ADMIN — OXCARBAZEPINE 150 MILLIGRAM(S): 300 TABLET, FILM COATED ORAL at 06:19

## 2018-10-05 RX ADMIN — PROPOFOL 2.18 MICROGRAM(S)/KG/MIN: 10 INJECTION, EMULSION INTRAVENOUS at 07:35

## 2018-10-05 RX ADMIN — HEPARIN SODIUM 5000 UNIT(S): 5000 INJECTION INTRAVENOUS; SUBCUTANEOUS at 06:05

## 2018-10-05 RX ADMIN — LEVETIRACETAM 400 MILLIGRAM(S): 250 TABLET, FILM COATED ORAL at 06:05

## 2018-10-05 RX ADMIN — Medication 650 MILLIGRAM(S): at 00:44

## 2018-10-05 RX ADMIN — OXCARBAZEPINE 150 MILLIGRAM(S): 300 TABLET, FILM COATED ORAL at 18:32

## 2018-10-05 RX ADMIN — PROPOFOL 2.18 MICROGRAM(S)/KG/MIN: 10 INJECTION, EMULSION INTRAVENOUS at 00:03

## 2018-10-05 RX ADMIN — CEFTRIAXONE 100 GRAM(S): 500 INJECTION, POWDER, FOR SOLUTION INTRAMUSCULAR; INTRAVENOUS at 00:44

## 2018-10-05 RX ADMIN — CEFTRIAXONE 100 GRAM(S): 500 INJECTION, POWDER, FOR SOLUTION INTRAMUSCULAR; INTRAVENOUS at 21:23

## 2018-10-05 RX ADMIN — HEPARIN SODIUM 5000 UNIT(S): 5000 INJECTION INTRAVENOUS; SUBCUTANEOUS at 21:23

## 2018-10-05 RX ADMIN — HEPARIN SODIUM 5000 UNIT(S): 5000 INJECTION INTRAVENOUS; SUBCUTANEOUS at 13:16

## 2018-10-05 RX ADMIN — LEVETIRACETAM 400 MILLIGRAM(S): 250 TABLET, FILM COATED ORAL at 19:29

## 2018-10-05 NOTE — H&P ADULT - HISTORY OF PRESENT ILLNESS
61 yo F with h/o seizure, HTN, on fluticasone, melatonin, Keppra, trilepta, norvasc.  Marroquin was found down on floor post-ictal by  at home, in EMS car she had seizure, versed was administered. Transfered to [facility], intubated for protection of airway and GCS 3 on admission per report. PanCT sone which showed no head bleed or solid organ injuries, has b/l proximal humerus fractures and L posterior shoulder dislocation. Vitals stable.

## 2018-10-05 NOTE — H&P ADULT - ATTENDING COMMENTS
S/p fall secondary to apparent seizure.  Bilateral shoulder injuries.      Was given sedation and then became obtunded at [facility] and was intubated.    Plan to eval and get ortho and neuro consults.

## 2018-10-05 NOTE — CONSULT NOTE ADULT - SUBJECTIVE AND OBJECTIVE BOX
Neurology Consult    Patient is a 62y old  Female who presents with a chief complaint of Found down (05 Oct 2018 01:35)      HPI:  63 yo F with h/o seizure, HTN, on fluticasone, melatonin, Keppra, trilepta, norvasc.  She was found down on floor post-ictal by  at home, in EMS car she had seizure, versed was administered. Transfered to [facility], intubated for protection of airway and GCS 3 on admission per report. PanCT sone which showed no head bleed or solid organ injuries, has b/l proximal humerus fractures and L posterior shoulder dislocation.     Patient is extubated this morning. PAtient and her  report this morning that denver galvez had seizures for 4 years- definite cause not known- ? related to her "pill" popping and drinking per [facility] he says she has under control now    Patient has no recollection fo the event- says " she was on the sofa"- fell off      PAST MEDICAL & SURGICAL HISTORY:  Seizure  Hypertension, unspecified type  Rib fractures  Crushing injury of skull, sequela  Breast implant status  History of appendectomy      FAMILY HISTORY:  No pertinent family history in first degree relatives    Allergies    No Known Allergies    MEDICATIONS  (STANDING):  cefTRIAXone   IVPB 1 Gram(s) IV Intermittent every 24 hours  heparin  Injectable 5000 Unit(s) SubCutaneous every 8 hours  levETIRAcetam  IVPB 500 milliGRAM(s) IV Intermittent every 12 hours  OXcarbazepine Suspension 150 milliGRAM(s) Oral two times a day  sodium chloride 0.9%. 1000 milliLiter(s) (100 mL/Hr) IV Continuous <Continuous>    MEDICATIONS  (PRN):      Review of systems:    Constitutional: No fever, weight loss or fatigue    Eyes: No eye pain or discharge  ENMT:  No difficulty hearing; No sinus or throat pain  Neck: No pain or stiffness  Respiratory: No cough, wheezing, chills or hemoptysis  Cardiovascular: No chest pain, palpitations, shortness of breath, dyspnea on exertion  Gastrointestinal: No abdominal pain, nausea, vomiting or hematemesis; No diarrhea or constipation.   Genitourinary: No dysuria, frequency, hematuria or incontinence  Neurological: As per HPI  Skin: No rashes or lesions   Endocrine: No heat or cold intolerance; No hair loss  Musculoskeletal: No joint pain or swelling  Psychiatric: No depression, anxiety, mood swings  Heme/Lymph: No easy bruising or bleeding gums    Vital Signs Last 24 Hrs  T(C): 38.4 (05 Oct 2018 04:00), Max: 39.2 (05 Oct 2018 00:28)  T(F): 101.1 (05 Oct 2018 04:00), Max: 102.6 (05 Oct 2018 00:28)  HR: 84 (05 Oct 2018 10:30) (84 - 120)  BP: 101/52 (05 Oct 2018 10:30) (76/64 - 196/96)  BP(mean): 73 (05 Oct 2018 10:30) (63 - 90)  RR: 25 (05 Oct 2018 10:30) (10 - 30)  SpO2: 99% (05 Oct 2018 10:30) (97% - 100%)    Neurologic Examination:  alert/ knows she is int he hospital- answers questions appropriately- is able to tell me her history  PERRL/ EOMI- face is symmetric- tongue is midline moves well  unable to raise both arms above her head- very painful movement- elbow and wrist strength 5/5  maximus LE_ good strength 5/5, no drift- slow rapid alt movements  LT/PP maximus intact  reflexes 1+  Pl flexors  gait not assessed    Labs:   CBC Full  -  ( 05 Oct 2018 04:55 )  WBC Count : 18.93 K/uL  Hemoglobin : 14.0 g/dL  Hematocrit : 40.1 %  Platelet Count - Automated : 211 K/uL  Mean Cell Volume : 96.2 fL  Mean Cell Hemoglobin : 33.6 pg  Mean Cell Hemoglobin Concentration : 34.9 g/dL  Auto Neutrophil # : 14.34 K/uL  Auto Lymphocyte # : 2.16 K/uL  Auto Monocyte # : 2.31 K/uL  Auto Eosinophil # : 0.00 K/uL  Auto Basophil # : 0.01 K/uL  Auto Neutrophil % : 75.7 %  Auto Lymphocyte % : 11.4 %  Auto Monocyte % : 12.2 %  Auto Eosinophil % : 0.0 %  Auto Basophil % : 0.1 %    10-05    135  |  97<L>  |  20  ----------------------------<  138<H>  5.0   |  16<L>  |  1.6<H>    Ca    9.0      05 Oct 2018 04:55  Mg     3.5     10-04    TPro  7.6  /  Alb  4.9  /  TBili  0.5  /  DBili  x   /  AST  60<H>  /  ALT  71<H>  /  AlkPhos  64  10-04    LIVER FUNCTIONS - ( 04 Oct 2018 21:31 )  Alb: 4.9 g/dL / Pro: 7.6 g/dL / ALK PHOS: 64 U/L / ALT: 71 U/L / AST: 60 U/L / GGT: x           PT/INR - ( 05 Oct 2018 04:55 )   PT: 11.60 sec;   INR: 1.07 ratio         PTT - ( 05 Oct 2018 04:55 )  PTT:30.5 sec  Urinalysis Basic - ( 05 Oct 2018 03:50 )    Color: Yellow / Appearance: Cloudy / SG: >=1.030 / pH: x  Gluc: x / Ketone: Negative  / Bili: Negative / Urobili: 0.2   Blood: x / Protein: 100 / Nitrite: Negative   Leuk Esterase: Moderate / RBC: 2-5 /HPF / WBC 10-25 /HPF   Sq Epi: x / Non Sq Epi: Few /HPF / Bacteria: Few /HPF          Neuroimaging:  NCHCT: CT Head No Cont:   EXAM:  CT BRAIN            PROCEDURE DATE:  10/04/2018            INTERPRETATION:  Clinical History / Reason for exam: Altered mental   status.    TECHNIQUE: Contiguous axial CT images were obtained from the base of the   skull to the vertex without administration of intravenous contrast.   Coronal and sagittal reformatted images were constructed.    COMPARISON: September 4, 2017.    FINDINGS:    The ventricles and cortical sulci are appropriate for the patient's   stated age.    Gray-white matter differentiation is preserved.    There is no evidence for intracranial hemorrhage, significant   space-occupying process, or recent territorial infarction.    The calvarium is intact. Visualized paranasal sinuses and mastoids are   well-aerated.      IMPRESSION:    No CT evidence for acute intracranial pathology.         10-05-18 @ 11:01

## 2018-10-05 NOTE — CONSULT NOTE ADULT - SUBJECTIVE AND OBJECTIVE BOX
seen and examined   intubated and sedated  no family present  per note, sz at home, went into status, intubated for protection  ?R shoulder dislcoation s/p spontaenous reduction?    PAST MEDICAL & SURGICAL HISTORY:  Seizure  Hypertension, unspecified type  Rib fractures  Crushing injury of skull, sequela  Breast implant status  History of appendectomy    Home Medications:  fluticasone 50 mcg/inh nasal spray: 2 spray(s) nasal once a day (07 Jun 2018 14:00)  melatonin 5 mg oral tablet: 1 tab(s) orally once a day (at bedtime) (07 Jun 2018 14:00)  Multiple Vitamins oral capsule: 1 cap(s) orally once a day (07 Jun 2018 14:00)  Norvasc 5 mg oral tablet: 1 tab(s) orally once a day (07 Jun 2018 14:00)    Allergies    No Known Allergies    Intolerances      PE  BUEs, BLEs skin intact comps soft  restraints on  not responsive to commands  wwp  no obvious crepitus or dislocation    CT scans L prox humerus fx, R hill sachs    A/P: ?S/p R shoulder dislocation with spontaneous reduction, and L prox humerus fx  no acute ortho intervention  slings BL when no longer in restrains  care per primary seen and examined   intubated and sedated  no family present  per note, sz at home, went into status, intubated for protection  ?R shoulder dislcoation s/p spontaenous reduction?    PAST MEDICAL & SURGICAL HISTORY:  Seizure  Hypertension, unspecified type  Rib fractures  Crushing injury of skull, sequela  Breast implant status  History of appendectomy    Home Medications:  fluticasone 50 mcg/inh nasal spray: 2 spray(s) nasal once a day (07 Jun 2018 14:00)  melatonin 5 mg oral tablet: 1 tab(s) orally once a day (at bedtime) (07 Jun 2018 14:00)  Multiple Vitamins oral capsule: 1 cap(s) orally once a day (07 Jun 2018 14:00)  Norvasc 5 mg oral tablet: 1 tab(s) orally once a day (07 Jun 2018 14:00)    Allergies    No Known Allergies    Intolerances      PE  BUEs, BLEs skin intact comps soft  restraints on  not responsive to commands  wwp  no obvious crepitus or dislocation    CT scans L prox humerus fx, R hill sachs    A/P: ?S/p R shoulder dislocation with spontaneous reduction, and L prox humerus fx  no acute ortho intervention  slings BL when no longer in restrains  care per primary  pt seen and examined  expected pain   NV intact    maximus slings  non op rx

## 2018-10-05 NOTE — H&P ADULT - ASSESSMENT
63 yo F with post-ictal b/l proximal humerus fracture, L posterior shoulder dislocation, s/p reduction  - hyperkalemia  - elevated troponins  - elevated CK  - febrile    Plan  Admission to trauma  NPO/NG  Vent   Hep sq  Nonop mngt per ortho  IV hydration  Keppra  Might need EEG and neurology evaluation  No need for LP for now, will pancx and start empiric Rocephine, might need to be changed   ECG, address hyperkalemia if ECG changes  Discussed with Dr. Kidd, Dr. Anders  Discussed with ED

## 2018-10-05 NOTE — CONSULT NOTE ADULT - CONSULT REASON
BL SHOULDE RPAIN
b/l proximal humerus fractures and L posterior shoulder, s/p fall from seizure.
seizure - intubated
status epilepticus

## 2018-10-05 NOTE — CONSULT NOTE ADULT - ASSESSMENT
A/P:    62 year old woman- admitted in status epilepticus- ? cause-  reports that meds were switched recently- does not know why they were switched or what switch was made    she does take her meds- no fever or other symptoms prior to this episode    the high WBC count may be post seizures- decreased from 32 to 18 thousand today    Continue Keppra 500 mg bid- can switch oral when possible- continue trileptal 150 mg bid  check b12, folate  thiamine 100 mgdaily
1. Seizure  - pt was taking meds - may have missed doses as she did not like kthe way the meds that were changed a month ago made her feel.  will bring in her new antiseizure med.   - load with leppra   - will sedate at this time  - CT head stat w/o contrats   - neuro fu in am   - eeg in am   - repeat all labs as wbc may be reactive   - pan cx  - serial lactate till clears   - IV fluids  - DVT ppx     CT 37 min

## 2018-10-05 NOTE — H&P ADULT - NSHPPHYSICALEXAM_GEN_ALL_CORE
Gen: intubated, sedated  Lungs: clear b/l  Abd: soft, ND  Extermities: b/l shoulder deformities, no swelling, b/l distal radial pulses  Neuro: withdraws to pain

## 2018-10-05 NOTE — CHART NOTE - NSCHARTNOTEFT_GEN_A_CORE
Downgrade Note  CORIE CHOI  62y Female  MRN: [id number]  Location: [facility]  A    Primary Dx: ALTERED MENTAL STATUS; CLOSED HEAD INJURY; SEIZURE    Hospital / SICU Course:  61 yo F with h/o seizure, HTN, on fluticasone, melatonin, Keppra, trilepta, norvasc.  Marroquin was found down on floor post-ictal by  at home, in EMS car she had seizure, versed was administered. Transfered to [facility], intubated for protection of airway and GCS 3 on admission per report. PanCT  which showed no head bleed or solid organ injuries, has b/l proximal humerus fractures and L posterior shoulder dislocation. Vitals stable. Neurology was consulted and said to continue Keppra and switch to oral when possible. Also, continue trileptal and give thiamine daily. Patient was extubated this morning, doing well. Advanced diet, pena out. Pt hemodynamically stable to transfer.     PMH/PSH:  Seizure  Hypertension, unspecified type    Rib fractures  Crushing injury of skull, sequela  Breast implant status  History of appendectomy    Medications:  cefTRIAXone   IVPB 1 Gram(s) IV Intermittent every 24 hours  heparin  Injectable 5000 Unit(s) SubCutaneous every 8 hours  levETIRAcetam  IVPB 500 milliGRAM(s) IV Intermittent every 12 hours  OXcarbazepine Suspension 150 milliGRAM(s) Oral two times a day  sodium chloride 0.9%. 1000 milliLiter(s) IV Continuous <Continuous>    Allergy:  No Known Allergies      Plan:  Neurologic: A&Ox3. Pt on home seizure meds, keppra and triptal.     Respiratory: Extubated this morning. Satting well on NC.     Cardiovascular: Keep normotensive, Hx of HTN, on Norvasc 5mg at home, held for now.    Gastrointestinal/Nutrition: Advanced to regular diet.    Renal/Genitourinary: no acute diagnosis, pena out at 14:00, awaiting trial of void.   creatinine 1.6, recently 1.1, atn from rhabdo, contrast induced, medication induced  hydration and serial labs    Hematologic: Hb stable.    Infectious Disease:   Leukocytosis WBC 26<--25. fevers 101.9, Pan cultured. F/U BAL, BCx, UA. On Ceftriaxone for ppx UTI    MSK: Comminuted left humeral neck, with lateral displacement of the proximal humeral shaft. R shoulder dislocation with spontaneous reduction. ORTHO following. no acute ortho intervention. slings BL when no longer in restrains     Lines/Tubes: PIV    Endocrine: Monitor FS glucose.  -----------------------------------------------------------------------------------------------------    Sign out provided to Trauma Team, Dr. urban , at  [phone]  10-05-18 @ 15:37

## 2018-10-05 NOTE — CONSULT NOTE ADULT - SUBJECTIVE AND OBJECTIVE BOX
SICU Consultation Note  =====================================================  62y Female  HPI:  61 yo F with h/o seizure, HTN, on fluticasone, melatonin, Keppra, trilepta, norvasc.  Marroquin was found down on floor post-ictal by  at home, in EMS car she had seizure, versed was administered. Transfered to [facility], intubated for protection of airway and GCS 3 on admission per report. PanCT sone which showed no head bleed or solid organ injuries, has b/l proximal humerus fractures and L posterior shoulder dislocation. Vitals stable. (05 Oct 2018 00:31)    PAST MEDICAL & SURGICAL HISTORY:  Seizure  Hypertension, unspecified type  Rib fractures  Crushing injury of skull, sequela  Breast implant status  History of appendectomy    Home Meds: Home Medications:  fluticasone 50 mcg/inh nasal spray: 2 spray(s) nasal once a day (07 Jun 2018 14:00)  melatonin 5 mg oral tablet: 1 tab(s) orally once a day (at bedtime) (07 Jun 2018 14:00)  Multiple Vitamins oral capsule: 1 cap(s) orally once a day (07 Jun 2018 14:00)  Norvasc 5 mg oral tablet: 1 tab(s) orally once a day (07 Jun 2018 14:00)    Allergies: Allergies    No Known Allergies    Intolerances    CURRENT MEDICATIONS:   --------------------------------------------------------------------------------------  Neurologic Medications  levETIRAcetam  IVPB 500 milliGRAM(s) IV Intermittent every 12 hours  OXcarbazepine Suspension 150 milliGRAM(s) Oral two times a day  propofol Infusion 5 MICROgram(s)/kG/Min IV Continuous <Continuous>    Respiratory Medications    Cardiovascular Medications    Gastrointestinal Medications  pantoprazole  Injectable 40 milliGRAM(s) IV Push daily  sodium chloride 0.9%. 1000 milliLiter(s) IV Continuous <Continuous>    Genitourinary Medications    Hematologic/Oncologic Medications  heparin  Injectable 5000 Unit(s) SubCutaneous every 8 hours    Antimicrobial/Immunologic Medications  cefTRIAXone   IVPB 1 Gram(s) IV Intermittent once  cefTRIAXone   IVPB        Endocrine/Metabolic Medications    Topical/Other Medications    --------------------------------------------------------------------------------------    VITAL SIGNS, INS/OUTS (last 24 hours):  --------------------------------------------------------------------------------------  ICU Vital Signs Last 24 Hrs  T(C): 39.2 (05 Oct 2018 00:28), Max: 39.2 (05 Oct 2018 00:28)  T(F): 102.6 (05 Oct 2018 00:28), Max: 102.6 (05 Oct 2018 00:28)  HR: 108 (05 Oct 2018 01:29) (90 - 120)  BP: 113/75 (05 Oct 2018 01:29) (111/69 - 196/96)  RR: 18 (05 Oct 2018 01:29) (10 - 18)  SpO2: 100% (05 Oct 2018 01:29) (99% - 100%)    I&O's Summary    04 Oct 2018 07:01  -  05 Oct 2018 01:38  --------------------------------------------------------  IN: 240 mL / OUT: 250 mL / NET: -10 mL    --------------------------------------------------------------------------------------    EXAM:  General/Neuro  RASS: -1  GCS: 3T  Exam: Intubated, sedated.  PETAR.    Respiratory  Exam: Lungs clear to auscultation, Normal expansion/effort.   [X] Intubated  Mechanical Ventilation: Mode: AC/ CMV (Assist Control/ Continuous Mandatory Ventilation)  RR (machine): 18  TV (machine): 450  FiO2: 40  PEEP: 5  ITime: 1  MAP: 9  PIP: 16    Cardiovascular  Exam: S1, S2.  Regular rate and rhythm.  Peripheral edema   Cardiac Rhythm: < from: 12 Lead ECG (10.04.18 @ 19:50) >  Sinus tachycardia    GI  Exam: Abdomen soft, Non-tender, Non-distended.   Current Diet:  NPO    Extremities  Exam: Extremities warm, pink, well-perfused.      Derm:  Exam: Good skin turgor, no skin breakdown.      :   Exam: Mccurdy catheter in place.     Tubes/Lines/Drains  ***  [x] Peripheral IV       [X] Urinary Catheter	Indication: Strict I & Os	Date Placed: 10/4    LABS  --------------------------------------------------------------------------------------             15.6   26.93 )-----------( 234      ( 04 Oct 2018 21:31 )             44.1     10-04    133<L>  |  96<L>  |  15  ----------------------------<  84  5.4<H>   |  18  |  1.2    Ca    9.0      04 Oct 2018 21:31  Mg     3.5     10-04    TPro  7.6  /  Alb  4.9  /  TBili  0.5  /  DBili  x   /  AST  60<H>  /  ALT  71<H>  /  AlkPhos  64  10-04    LIVER FUNCTIONS - ( 04 Oct 2018 21:31 )  Alb: 4.9 g/dL / Pro: 7.6 g/dL / ALK PHOS: 64 U/L / ALT: 71 U/L / AST: 60 U/L / GGT: x           CARDIAC MARKERS ( 04 Oct 2018 21:31 )  x     / 0.03 ng/mL / x     / x     / x      CARDIAC MARKERS ( 04 Oct 2018 21:16 )  x     / 0.03 ng/mL / 1936 U/L / x     / 15.6 ng/mL  CARDIAC MARKERS ( 04 Oct 2018 19:20 )  x     / 0.02 ng/mL / x     / x     / x        ABG - ( 04 Oct 2018 23:46 )  pH, Arterial: 7.38  pH, Blood: x     /  pCO2: 30    /  pO2: 120   / HCO3: 17    / Base Excess: -6.5  /  SaO2: 99        CAPILLARY BLOOD GLUCOSE  166 (04 Oct 2018 18:23)    Urinalysis Basic - ( 04 Oct 2018 22:13 )    Color: Yellow / Appearance: Clear / SG: >=1.030 / pH: x  Gluc: x / Ketone: Negative  / Bili: Negative / Urobili: 0.2 mg/dL   Blood: x / Protein: 100 mg/dL / Nitrite: Negative   Leuk Esterase: Negative / RBC: 5-10 /HPF / WBC 3-5 /HPF   Sq Epi: x / Non Sq Epi: Few /HPF / Bacteria: Many    --------------------------------------------------------------------------------------    OTHER LABS    IMAGING RESULTS    ASSESSMENT:  62y Female s/p fall w/ hx of seizures on 2 keppra & trilepta. found down on floor post-ictal by  at home, in EMS transport she had seizure, versed was administered. Transferred to [facility], intubated for protection of airway and GCS 3 on admission  PanCT  was done which showed no head bleed or solid organ injuries, has b/l proximal humerus fractures and L posterior shoulder dislocation.     PLAN:   Neurologic:    Respiratory:   Cardiovascular:   Gastrointestinal/Nutrition:   Renal/Genitourinary:   Hematologic:   Infectious Disease:   Lines/Tubes:  Endocrine:   Disposition:     --------------------------------------------------------------------------------------    Critical Care Diagnoses: SICU Consultation Note  =====================================================  62y Female  HPI:  61 yo F with h/o seizure, HTN, on fluticasone, melatonin, Keppra, trilepta, norvasc.  Marroquin was found down on floor post-ictal by  at home, in EMS car she had seizure, versed was administered. Transfered to [facility], intubated for protection of airway and GCS 3 on admission per report. PanCT sone which showed no head bleed or solid organ injuries, has b/l proximal humerus fractures and L posterior shoulder dislocation. Vitals stable. (05 Oct 2018 00:31)    PAST MEDICAL & SURGICAL HISTORY:  Seizure  Hypertension, unspecified type  Rib fractures  Crushing injury of skull, sequela  Breast implant status  History of appendectomy    Home Meds: Home Medications:  fluticasone 50 mcg/inh nasal spray: 2 spray(s) nasal once a day (07 Jun 2018 14:00)  melatonin 5 mg oral tablet: 1 tab(s) orally once a day (at bedtime) (07 Jun 2018 14:00)  Multiple Vitamins oral capsule: 1 cap(s) orally once a day (07 Jun 2018 14:00)  Norvasc 5 mg oral tablet: 1 tab(s) orally once a day (07 Jun 2018 14:00)    Allergies: Allergies    No Known Allergies    Intolerances    CURRENT MEDICATIONS:   --------------------------------------------------------------------------------------  Neurologic Medications  levETIRAcetam  IVPB 500 milliGRAM(s) IV Intermittent every 12 hours  OXcarbazepine Suspension 150 milliGRAM(s) Oral two times a day  propofol Infusion 5 MICROgram(s)/kG/Min IV Continuous <Continuous>    Respiratory Medications    Cardiovascular Medications    Gastrointestinal Medications  pantoprazole  Injectable 40 milliGRAM(s) IV Push daily  sodium chloride 0.9%. 1000 milliLiter(s) IV Continuous <Continuous>    Genitourinary Medications    Hematologic/Oncologic Medications  heparin  Injectable 5000 Unit(s) SubCutaneous every 8 hours    Antimicrobial/Immunologic Medications  cefTRIAXone   IVPB 1 Gram(s) IV Intermittent once  cefTRIAXone   IVPB        Endocrine/Metabolic Medications    Topical/Other Medications    --------------------------------------------------------------------------------------    VITAL SIGNS, INS/OUTS (last 24 hours):  --------------------------------------------------------------------------------------  ICU Vital Signs Last 24 Hrs  T(C): 39.2 (05 Oct 2018 00:28), Max: 39.2 (05 Oct 2018 00:28)  T(F): 102.6 (05 Oct 2018 00:28), Max: 102.6 (05 Oct 2018 00:28)  HR: 108 (05 Oct 2018 01:29) (90 - 120)  BP: 113/75 (05 Oct 2018 01:29) (111/69 - 196/96)  RR: 18 (05 Oct 2018 01:29) (10 - 18)  SpO2: 100% (05 Oct 2018 01:29) (99% - 100%)    I&O's Summary    04 Oct 2018 07:01  -  05 Oct 2018 01:38  --------------------------------------------------------  IN: 240 mL / OUT: 250 mL / NET: -10 mL    --------------------------------------------------------------------------------------    EXAM:  General/Neuro  RASS: -1  GCS: 3T  Exam: Intubated, sedated.  following commands. PETAR.    Respiratory  Exam: Lungs clear to auscultation, Normal expansion/effort.   [X] Intubated  Mechanical Ventilation: Mode: AC/ CMV (Assist Control/ Continuous Mandatory Ventilation)  RR (machine): 18  TV (machine): 450  FiO2: 40  PEEP: 5  ITime: 1  MAP: 9  PIP: 16    Cardiovascular  Exam: S1, S2.  Regular rate and rhythm.  Peripheral edema   Cardiac Rhythm: < from: 12 Lead ECG (10.04.18 @ 19:50) >  Sinus tachycardia    GI  Exam: Abdomen soft, Non-tender, Non-distended.   Current Diet:  NPO    Extremities  Exam: Extremities warm, pink, well-perfused.      Derm:  Exam: Good skin turgor, no skin breakdown.      :   Exam: Mccurdy catheter in place.     Tubes/Lines/Drains  ***  [x] Peripheral IV       [X] Urinary Catheter	Indication: Strict I & Os	Date Placed: 10/4    LABS  --------------------------------------------------------------------------------------             15.6   26.93 )-----------( 234      ( 04 Oct 2018 21:31 )             44.1     10-04    133<L>  |  96<L>  |  15  ----------------------------<  84  5.4<H>   |  18  |  1.2    Ca    9.0      04 Oct 2018 21:31  Mg     3.5     10-04    TPro  7.6  /  Alb  4.9  /  TBili  0.5  /  DBili  x   /  AST  60<H>  /  ALT  71<H>  /  AlkPhos  64  10-04    LIVER FUNCTIONS - ( 04 Oct 2018 21:31 )  Alb: 4.9 g/dL / Pro: 7.6 g/dL / ALK PHOS: 64 U/L / ALT: 71 U/L / AST: 60 U/L / GGT: x           CARDIAC MARKERS ( 04 Oct 2018 21:31 )  x     / 0.03 ng/mL / x     / x     / x      CARDIAC MARKERS ( 04 Oct 2018 21:16 )  x     / 0.03 ng/mL / 1936 U/L / x     / 15.6 ng/mL  CARDIAC MARKERS ( 04 Oct 2018 19:20 )  x     / 0.02 ng/mL / x     / x     / x        ABG - ( 04 Oct 2018 23:46 )  pH, Arterial: 7.38  pH, Blood: x     /  pCO2: 30    /  pO2: 120   / HCO3: 17    / Base Excess: -6.5  /  SaO2: 99        CAPILLARY BLOOD GLUCOSE  166 (04 Oct 2018 18:23)    Urinalysis Basic - ( 04 Oct 2018 22:13 )    Color: Yellow / Appearance: Clear / SG: >=1.030 / pH: x  Gluc: x / Ketone: Negative  / Bili: Negative / Urobili: 0.2 mg/dL   Blood: x / Protein: 100 mg/dL / Nitrite: Negative   Leuk Esterase: Negative / RBC: 5-10 /HPF / WBC 3-5 /HPF   Sq Epi: x / Non Sq Epi: Few /HPF / Bacteria: Many    --------------------------------------------------------------------------------------    OTHER LABS    IMAGING RESULTS    ASSESSMENT:  62y Female s/p fall w/ hx of seizures on 2 keppra & trilepta. found down on floor post-ictal by  at home, in EMS transport she had seizure, versed was administered. Transferred to [facility], intubated for protection of airway and GCS 3 on admission  PanCT  was done which showed no head bleed or solid organ injuries, has b/l proximal humerus fractures and L posterior shoulder dislocation.     PLAN:   Neurologic: Intubated for airway protection s/p seizure.  on sedation w/ propofol, follow commands. EEG in am.    Respiratory: Minimal vent settings 40/5. SBT in am, and possible extubation    Cardiovascular: Keep normotensive, Hx of HTN, on Norvasc 5mg at home, held for now.    Gastrointestinal/Nutrition: NPO, OGT, PPI prophylaxis    Renal/Genitourinary: no acute diagnosis, replete electrolytes as needed.    Hematologic: Hb stable.    Infectious Disease: Leukocytosis WBC 26<--25. fevers 101.9, Pan cultured. F/U BAL, BCx, UA.     MSK: Comminuted left humeral neck, with lateral displacement of the proximal humeral shaft. R shoulder dislocation with spontaneous reduction. ORTHO following. no acute ortho intervention.    Lines/Tubes: ET/ OGT, Mccurdy, PIV.     Endocrine: Monitor FS glucose.    Disposition: Continue ICU monitoring. SICU Consultation Note  =====================================================  62y Female  HPI:  61 yo F with h/o seizure, HTN, on fluticasone, melatonin, Keppra, trilepta, norvasc.  Marroquin was found down on floor post-ictal by  at home, in EMS car she had seizure, versed was administered. Transfered to [facility], intubated for protection of airway and GCS 3 on admission per report. PanCT sone which showed no head bleed or solid organ injuries, has b/l proximal humerus fractures and L posterior shoulder dislocation. Vitals stable. (05 Oct 2018 00:31)    PAST MEDICAL & SURGICAL HISTORY:  Seizure  Hypertension, unspecified type  Rib fractures  Crushing injury of skull, sequela  Breast implant status  History of appendectomy    Home Meds: Home Medications:  fluticasone 50 mcg/inh nasal spray: 2 spray(s) nasal once a day (07 Jun 2018 14:00)  melatonin 5 mg oral tablet: 1 tab(s) orally once a day (at bedtime) (07 Jun 2018 14:00)  Multiple Vitamins oral capsule: 1 cap(s) orally once a day (07 Jun 2018 14:00)  Norvasc 5 mg oral tablet: 1 tab(s) orally once a day (07 Jun 2018 14:00)    Allergies: Allergies    No Known Allergies    Intolerances    CURRENT MEDICATIONS:   --------------------------------------------------------------------------------------  Neurologic Medications  levETIRAcetam  IVPB 500 milliGRAM(s) IV Intermittent every 12 hours  OXcarbazepine Suspension 150 milliGRAM(s) Oral two times a day  propofol Infusion 5 MICROgram(s)/kG/Min IV Continuous <Continuous>    Respiratory Medications    Cardiovascular Medications    Gastrointestinal Medications  pantoprazole  Injectable 40 milliGRAM(s) IV Push daily  sodium chloride 0.9%. 1000 milliLiter(s) IV Continuous <Continuous>    Genitourinary Medications    Hematologic/Oncologic Medications  heparin  Injectable 5000 Unit(s) SubCutaneous every 8 hours    Antimicrobial/Immunologic Medications  cefTRIAXone   IVPB 1 Gram(s) IV Intermittent once  cefTRIAXone   IVPB        Endocrine/Metabolic Medications    Topical/Other Medications    --------------------------------------------------------------------------------------    VITAL SIGNS, INS/OUTS (last 24 hours):  --------------------------------------------------------------------------------------  ICU Vital Signs Last 24 Hrs  T(C): 39.2 (05 Oct 2018 00:28), Max: 39.2 (05 Oct 2018 00:28)  T(F): 102.6 (05 Oct 2018 00:28), Max: 102.6 (05 Oct 2018 00:28)  HR: 108 (05 Oct 2018 01:29) (90 - 120)  BP: 113/75 (05 Oct 2018 01:29) (111/69 - 196/96)  RR: 18 (05 Oct 2018 01:29) (10 - 18)  SpO2: 100% (05 Oct 2018 01:29) (99% - 100%)    I&O's Summary    04 Oct 2018 07:01  -  05 Oct 2018 01:38  --------------------------------------------------------  IN: 240 mL / OUT: 250 mL / NET: -10 mL    --------------------------------------------------------------------------------------  EXAM:  General/Neuro  RASS: -1  GCS: 3T  Exam: Intubated, sedated.  following commands. PETAR.    Respiratory  Exam: Lungs clear to auscultation, Normal expansion/effort.   [X] Intubated  Mechanical Ventilation: Mode: AC/ CMV (Assist Control/ Continuous Mandatory Ventilation)  RR (machine): 18  TV (machine): 450  FiO2: 40  PEEP: 5  ITime: 1  MAP: 9  PIP: 16    Cardiovascular  Exam: S1, S2.  Regular rate and rhythm.  Peripheral edema   Cardiac Rhythm: < from: 12 Lead ECG (10.04.18 @ 19:50) >  Sinus tachycardia    GI  Exam: Abdomen soft, Non-tender, Non-distended.   Current Diet:  NPO    Extremities  Exam: Extremities warm, pink, well-perfused.      Derm:  Exam: Good skin turgor, no skin breakdown.      :   Exam: Mccurdy catheter in place.     Tubes/Lines/Drains  ***  [x] Peripheral IV       [X] Urinary Catheter	Indication: Strict I & Os	Date Placed: 10/4    LABS  --------------------------------------------------------------------------------------             15.6   26.93 )-----------( 234      ( 04 Oct 2018 21:31 )             44.1     10-04    133<L>  |  96<L>  |  15  ----------------------------<  84  5.4<H>   |  18  |  1.2    Ca    9.0      04 Oct 2018 21:31  Mg     3.5     10-04    TPro  7.6  /  Alb  4.9  /  TBili  0.5  /  DBili  x   /  AST  60<H>  /  ALT  71<H>  /  AlkPhos  64  10-04    LIVER FUNCTIONS - ( 04 Oct 2018 21:31 )  Alb: 4.9 g/dL / Pro: 7.6 g/dL / ALK PHOS: 64 U/L / ALT: 71 U/L / AST: 60 U/L / GGT: x           CARDIAC MARKERS ( 04 Oct 2018 21:31 )  x     / 0.03 ng/mL / x     / x     / x      CARDIAC MARKERS ( 04 Oct 2018 21:16 )  x     / 0.03 ng/mL / 1936 U/L / x     / 15.6 ng/mL  CARDIAC MARKERS ( 04 Oct 2018 19:20 )  x     / 0.02 ng/mL / x     / x     / x        ABG - ( 04 Oct 2018 23:46 )  pH, Arterial: 7.38  pH, Blood: x     /  pCO2: 30    /  pO2: 120   / HCO3: 17    / Base Excess: -6.5  /  SaO2: 99        CAPILLARY BLOOD GLUCOSE  166 (04 Oct 2018 18:23)    Urinalysis Basic - ( 04 Oct 2018 22:13 )    Color: Yellow / Appearance: Clear / SG: >=1.030 / pH: x  Gluc: x / Ketone: Negative  / Bili: Negative / Urobili: 0.2 mg/dL   Blood: x / Protein: 100 mg/dL / Nitrite: Negative   Leuk Esterase: Negative / RBC: 5-10 /HPF / WBC 3-5 /HPF   Sq Epi: x / Non Sq Epi: Few /HPF / Bacteria: Many    --------------------------------------------------------------------------------------  OTHER LABS    IMAGING RESULTS    ASSESSMENT:  62y Female s/p fall w/ hx of seizures on 2 keppra & trilepta. found down on floor post-ictal by  at home, in EMS transport she had seizure, versed was administered. Transferred to [facility], intubated for protection of airway and GCS 3 on admission  PanCT  was done which showed no head bleed or solid organ injuries, has b/l proximal humerus fractures and L posterior shoulder dislocation.     PLAN:   Neurologic: Intubated for airway protection s/p seizure.  on sedation w/ propofol, follow commands. EEG in am.    Respiratory: Minimal vent settings 40/5. SBT in am, and possible extubation    Cardiovascular: Keep normotensive, Hx of HTN, on Norvasc 5mg at home, held for now.    Gastrointestinal/Nutrition: NPO, OGT, PPI prophylaxis    Renal/Genitourinary: no acute diagnosis, replete electrolytes as needed.    Hematologic: Hb stable.    Infectious Disease: Leukocytosis WBC 26<--25. fevers 101.9, Pan cultured. F/U BAL, BCx, UA.     MSK: Comminuted left humeral neck, with lateral displacement of the proximal humeral shaft. R shoulder dislocation with spontaneous reduction. ORTHO following. no acute ortho intervention. slings BL when no longer in restrains     Lines/Tubes: ET/ OGT, Mccurdy, PIV.     Endocrine: Monitor FS glucose.    Disposition: Continue ICU monitoring. SICU Consultation Note  =====================================================  62y Female  HPI:  63 yo F with h/o seizure, HTN, on fluticasone, melatonin, Keppra, trilepta, norvasc.  Marroquin was found down on floor post-ictal by  at home, in EMS car she had seizure, versed was administered. Transfered to [facility], intubated for protection of airway and GCS 3 on admission per report. PanCT sone which showed no head bleed or solid organ injuries, has b/l proximal humerus fractures and L posterior shoulder dislocation. Vitals stable. (05 Oct 2018 00:31)    PAST MEDICAL & SURGICAL HISTORY:  Seizure  Hypertension, unspecified type  Rib fractures  Crushing injury of skull, sequela  Breast implant status  History of appendectomy    Home Meds: Home Medications:  fluticasone 50 mcg/inh nasal spray: 2 spray(s) nasal once a day (07 Jun 2018 14:00)  melatonin 5 mg oral tablet: 1 tab(s) orally once a day (at bedtime) (07 Jun 2018 14:00)  Multiple Vitamins oral capsule: 1 cap(s) orally once a day (07 Jun 2018 14:00)  Norvasc 5 mg oral tablet: 1 tab(s) orally once a day (07 Jun 2018 14:00)    Allergies: Allergies    No Known Allergies    Intolerances    CURRENT MEDICATIONS:   --------------------------------------------------------------------------------------  Neurologic Medications  levETIRAcetam  IVPB 500 milliGRAM(s) IV Intermittent every 12 hours  OXcarbazepine Suspension 150 milliGRAM(s) Oral two times a day  propofol Infusion 5 MICROgram(s)/kG/Min IV Continuous <Continuous>    Respiratory Medications    Cardiovascular Medications    Gastrointestinal Medications  pantoprazole  Injectable 40 milliGRAM(s) IV Push daily  sodium chloride 0.9%. 1000 milliLiter(s) IV Continuous <Continuous>    Genitourinary Medications    Hematologic/Oncologic Medications  heparin  Injectable 5000 Unit(s) SubCutaneous every 8 hours    Antimicrobial/Immunologic Medications  cefTRIAXone   IVPB 1 Gram(s) IV Intermittent once  cefTRIAXone   IVPB        Endocrine/Metabolic Medications    Topical/Other Medications    --------------------------------------------------------------------------------------    VITAL SIGNS, INS/OUTS (last 24 hours):  --------------------------------------------------------------------------------------  ICU Vital Signs Last 24 Hrs  T(C): 39.2 (05 Oct 2018 00:28), Max: 39.2 (05 Oct 2018 00:28)  T(F): 102.6 (05 Oct 2018 00:28), Max: 102.6 (05 Oct 2018 00:28)  HR: 108 (05 Oct 2018 01:29) (90 - 120)  BP: 113/75 (05 Oct 2018 01:29) (111/69 - 196/96)  RR: 18 (05 Oct 2018 01:29) (10 - 18)  SpO2: 100% (05 Oct 2018 01:29) (99% - 100%)    I&O's Summary    04 Oct 2018 07:01  -  05 Oct 2018 01:38  --------------------------------------------------------  IN: 240 mL / OUT: 250 mL / NET: -10 mL    --------------------------------------------------------------------------------------  EXAM:  General/Neuro  RASS: -1  GCS: 3T  Exam: Intubated, sedated.  following commands. PETAR.    Respiratory  Exam: Lungs clear to auscultation, Normal expansion/effort.   [X] Intubated  Mechanical Ventilation: Mode: AC/ CMV (Assist Control/ Continuous Mandatory Ventilation)  RR (machine): 18  TV (machine): 450  FiO2: 40  PEEP: 5  ITime: 1  MAP: 9  PIP: 16    Cardiovascular  Exam: S1, S2.  Regular rate and rhythm.  Peripheral edema   Cardiac Rhythm: < from: 12 Lead ECG (10.04.18 @ 19:50) >  Sinus tachycardia    GI  Exam: Abdomen soft, Non-tender, Non-distended.   Current Diet:  NPO    Extremities  Exam: Extremities warm, pink, well-perfused.      Derm:  Exam: Good skin turgor, no skin breakdown.      :   Exam: Mccurdy catheter in place.     Tubes/Lines/Drains  ***  [x] Peripheral IV       [X] Urinary Catheter	Indication: Strict I & Os	Date Placed: 10/4    LABS  --------------------------------------------------------------------------------------             15.6   26.93 )-----------( 234      ( 04 Oct 2018 21:31 )             44.1     10-04    133<L>  |  96<L>  |  15  ----------------------------<  84  5.4<H>   |  18  |  1.2    Ca    9.0      04 Oct 2018 21:31  Mg     3.5     10-04    TPro  7.6  /  Alb  4.9  /  TBili  0.5  /  DBili  x   /  AST  60<H>  /  ALT  71<H>  /  AlkPhos  64  10-04    LIVER FUNCTIONS - ( 04 Oct 2018 21:31 )  Alb: 4.9 g/dL / Pro: 7.6 g/dL / ALK PHOS: 64 U/L / ALT: 71 U/L / AST: 60 U/L / GGT: x           CARDIAC MARKERS ( 04 Oct 2018 21:31 )  x     / 0.03 ng/mL / x     / x     / x      CARDIAC MARKERS ( 04 Oct 2018 21:16 )  x     / 0.03 ng/mL / 1936 U/L / x     / 15.6 ng/mL  CARDIAC MARKERS ( 04 Oct 2018 19:20 )  x     / 0.02 ng/mL / x     / x     / x        ABG - ( 04 Oct 2018 23:46 )  pH, Arterial: 7.38  pH, Blood: x     /  pCO2: 30    /  pO2: 120   / HCO3: 17    / Base Excess: -6.5  /  SaO2: 99        CAPILLARY BLOOD GLUCOSE  166 (04 Oct 2018 18:23)    Urinalysis Basic - ( 04 Oct 2018 22:13 )    Color: Yellow / Appearance: Clear / SG: >=1.030 / pH: x  Gluc: x / Ketone: Negative  / Bili: Negative / Urobili: 0.2 mg/dL   Blood: x / Protein: 100 mg/dL / Nitrite: Negative   Leuk Esterase: Negative / RBC: 5-10 /HPF / WBC 3-5 /HPF   Sq Epi: x / Non Sq Epi: Few /HPF / Bacteria: Many    --------------------------------------------------------------------------------------  OTHER LABS    IMAGING RESULTS    ASSESSMENT:  62y Female s/p fall w/ hx of seizures on 2 keppra & trilepta. found down on floor post-ictal by  at home, in EMS transport she had seizure, versed was administered. Transferred to [facility], intubated for protection of airway and GCS 3 on admission  PanCT  was done which showed no head bleed or solid organ injuries, has b/l proximal humerus fractures and L posterior shoulder dislocation.     PLAN:   Neurologic:   seizure at home on home meds, had breakthrough seizure  Intubated for airway protection s/p seizure.    on sedation w/ propofol, follow commands. EEG in am.  neurology cs    Respiratory: Minimal vent settings 40/5. SBT in am, and possible extubation  extubating    Cardiovascular: Keep normotensive, Hx of HTN, on Norvasc 5mg at home, held for now.    Gastrointestinal/Nutrition: NPO, OGT, PPI prophylaxis    Renal/Genitourinary: no acute diagnosis, replete electrolytes as needed.  creatinine 1.6, recently 1.1, atn from rhabdo, contrast induced, medication induced  hydration and serial labs    Hematologic: Hb stable.    Infectious Disease:   Leukocytosis WBC 26<--25. fevers 101.9, Pan cultured. F/U BAL, BCx, UA.     MSK: Comminuted left humeral neck, with lateral displacement of the proximal humeral shaft. R shoulder dislocation with spontaneous reduction. ORTHO following. no acute ortho intervention. slings BL when no longer in restrains     Lines/Tubes: ET/ OGT, Mccurdy, PIV.     Endocrine: Monitor FS glucose.    Disposition: Continue ICU monitoring.

## 2018-10-05 NOTE — H&P ADULT - NSHPLABSRESULTS_GEN_ALL_CORE
15.6   26.93 )-----------( 234      ( 04 Oct 2018 21:31 )             44.1   10-04    133<L>  |  96<L>  |  15  ----------------------------<  84  5.4<H>   |  18  |  1.2    Ca    9.0      04 Oct 2018 21:31  Mg     3.5     10-04    TPro  7.6  /  Alb  4.9  /  TBili  0.5  /  DBili  x   /  AST  60<H>  /  ALT  71<H>  /  AlkPhos  64  10-04    Lactate 3.3 (6)    < from: CT Abdomen and Pelvis w/ IV Cont (10.04.18 @ 20:38) >    IMPRESSION:          Bilateral glenohumeral joint abnormality as above.    Endotracheal tube tip at the right mainstem bronchus; consider retraction    Otherwise no CT evidence for acute posttraumatic injury to the chest,   abdomen or pelvis    < end of copied text >    < from: CT Chest w/ IV Cont (10.04.18 @ 20:37) >    BONES/SOFT TISSUES:Fragmented appearance of the posterior right glenoid,   with corresponding indentation in the anterior humeral head suggesting   reverse Bankart and hill-sachs lesion. Comminuted left humeral neck, with   lateral displacement of the proximal humeral shaft. Posterior subluxation   of the left glenohumeral joint. Bilateral breast prostheses.   Chronic left-sided rib fractures.    < end of copied text >    < from: CT Head No Cont (10.04.18 @ 20:31) >    IMPRESSION:    No CT evidence for acute intracranial pathology.      < end of copied text >      < from: CT Cervical Spine No Cont (10.04.18 @ 20:34) >    IMPRESSION:    Overall, no significant change since the prior exam.    No evidence of a cervical spine fracture or subluxation.    Straightening of the cervical lordosis secondary to positioning or muscle   spasm.    < end of copied text >

## 2018-10-06 ENCOUNTER — TRANSCRIPTION ENCOUNTER (OUTPATIENT)
Age: 63
End: 2018-10-06

## 2018-10-06 VITALS
SYSTOLIC BLOOD PRESSURE: 106 MMHG | TEMPERATURE: 98 F | DIASTOLIC BLOOD PRESSURE: 53 MMHG | RESPIRATION RATE: 18 BRPM | HEART RATE: 76 BPM

## 2018-10-06 LAB
ALBUMIN SERPL ELPH-MCNC: 3.6 G/DL — SIGNIFICANT CHANGE UP (ref 3.5–5.2)
ALP SERPL-CCNC: 43 U/L — SIGNIFICANT CHANGE UP (ref 30–115)
ALT FLD-CCNC: 46 U/L — HIGH (ref 0–41)
ANION GAP SERPL CALC-SCNC: 16 MMOL/L — HIGH (ref 7–14)
ANION GAP SERPL CALC-SCNC: 17 MMOL/L — HIGH (ref 7–14)
APTT BLD: 23.8 SEC — CRITICAL LOW (ref 27–39.2)
APTT BLD: 26.2 SEC — LOW (ref 27–39.2)
AST SERPL-CCNC: 65 U/L — HIGH (ref 0–41)
BASOPHILS # BLD AUTO: 0.01 K/UL — SIGNIFICANT CHANGE UP (ref 0–0.2)
BASOPHILS NFR BLD AUTO: 0.1 % — SIGNIFICANT CHANGE UP (ref 0–1)
BILIRUB DIRECT SERPL-MCNC: <0.2 MG/DL — SIGNIFICANT CHANGE UP (ref 0–0.2)
BILIRUB INDIRECT FLD-MCNC: >0.2 MG/DL — SIGNIFICANT CHANGE UP (ref 0.2–1.2)
BILIRUB SERPL-MCNC: 0.4 MG/DL — SIGNIFICANT CHANGE UP (ref 0.2–1.2)
BUN SERPL-MCNC: 22 MG/DL — HIGH (ref 10–20)
BUN SERPL-MCNC: 23 MG/DL — HIGH (ref 10–20)
CALCIUM SERPL-MCNC: 8.2 MG/DL — LOW (ref 8.5–10.1)
CALCIUM SERPL-MCNC: 8.3 MG/DL — LOW (ref 8.5–10.1)
CHLORIDE SERPL-SCNC: 101 MMOL/L — SIGNIFICANT CHANGE UP (ref 98–110)
CHLORIDE SERPL-SCNC: 103 MMOL/L — SIGNIFICANT CHANGE UP (ref 98–110)
CK SERPL-CCNC: 6628 U/L — HIGH (ref 0–225)
CO2 SERPL-SCNC: 17 MMOL/L — SIGNIFICANT CHANGE UP (ref 17–32)
CO2 SERPL-SCNC: 18 MMOL/L — SIGNIFICANT CHANGE UP (ref 17–32)
CREAT SERPL-MCNC: 1.2 MG/DL — SIGNIFICANT CHANGE UP (ref 0.7–1.5)
CREAT SERPL-MCNC: 1.3 MG/DL — SIGNIFICANT CHANGE UP (ref 0.7–1.5)
EOSINOPHIL # BLD AUTO: 0.01 K/UL — SIGNIFICANT CHANGE UP (ref 0–0.7)
EOSINOPHIL NFR BLD AUTO: 0.1 % — SIGNIFICANT CHANGE UP (ref 0–8)
GLUCOSE SERPL-MCNC: 96 MG/DL — SIGNIFICANT CHANGE UP (ref 70–99)
GLUCOSE SERPL-MCNC: 96 MG/DL — SIGNIFICANT CHANGE UP (ref 70–99)
HCT VFR BLD CALC: 27.5 % — LOW (ref 37–47)
HCT VFR BLD CALC: 30.2 % — LOW (ref 37–47)
HGB BLD-MCNC: 10.4 G/DL — LOW (ref 12–16)
HGB BLD-MCNC: 9.7 G/DL — LOW (ref 12–16)
IMM GRANULOCYTES NFR BLD AUTO: 0.5 % — HIGH (ref 0.1–0.3)
INR BLD: 1.07 RATIO — SIGNIFICANT CHANGE UP (ref 0.65–1.3)
INR BLD: 1.12 RATIO — SIGNIFICANT CHANGE UP (ref 0.65–1.3)
LACTATE SERPL-SCNC: 1.1 MMOL/L — SIGNIFICANT CHANGE UP (ref 0.5–2.2)
LEVETIRACETAM SERPL-MCNC: <2 MCG/ML — LOW (ref 12–46)
LYMPHOCYTES # BLD AUTO: 19.5 % — LOW (ref 20.5–51.1)
LYMPHOCYTES # BLD AUTO: 2.2 K/UL — SIGNIFICANT CHANGE UP (ref 1.2–3.4)
MAGNESIUM SERPL-MCNC: 2.5 MG/DL — HIGH (ref 1.8–2.4)
MAGNESIUM SERPL-MCNC: 2.6 MG/DL — HIGH (ref 1.8–2.4)
MCHC RBC-ENTMCNC: 33.2 PG — HIGH (ref 27–31)
MCHC RBC-ENTMCNC: 33.7 PG — HIGH (ref 27–31)
MCHC RBC-ENTMCNC: 34.4 G/DL — SIGNIFICANT CHANGE UP (ref 32–37)
MCHC RBC-ENTMCNC: 35.3 G/DL — SIGNIFICANT CHANGE UP (ref 32–37)
MCV RBC AUTO: 95.5 FL — SIGNIFICANT CHANGE UP (ref 81–99)
MCV RBC AUTO: 96.5 FL — SIGNIFICANT CHANGE UP (ref 81–99)
MONOCYTES # BLD AUTO: 0.89 K/UL — HIGH (ref 0.1–0.6)
MONOCYTES NFR BLD AUTO: 7.9 % — SIGNIFICANT CHANGE UP (ref 1.7–9.3)
NEUTROPHILS # BLD AUTO: 8.09 K/UL — HIGH (ref 1.4–6.5)
NEUTROPHILS NFR BLD AUTO: 71.9 % — SIGNIFICANT CHANGE UP (ref 42.2–75.2)
NRBC # BLD: 0 /100 WBCS — SIGNIFICANT CHANGE UP (ref 0–0)
PHOSPHATE SERPL-MCNC: 3.9 MG/DL — SIGNIFICANT CHANGE UP (ref 2.1–4.9)
PHOSPHATE SERPL-MCNC: 4.2 MG/DL — SIGNIFICANT CHANGE UP (ref 2.1–4.9)
PLATELET # BLD AUTO: 148 K/UL — SIGNIFICANT CHANGE UP (ref 130–400)
PLATELET # BLD AUTO: 150 K/UL — SIGNIFICANT CHANGE UP (ref 130–400)
POTASSIUM SERPL-MCNC: 3.7 MMOL/L — SIGNIFICANT CHANGE UP (ref 3.5–5)
POTASSIUM SERPL-MCNC: 3.7 MMOL/L — SIGNIFICANT CHANGE UP (ref 3.5–5)
POTASSIUM SERPL-SCNC: 3.7 MMOL/L — SIGNIFICANT CHANGE UP (ref 3.5–5)
POTASSIUM SERPL-SCNC: 3.7 MMOL/L — SIGNIFICANT CHANGE UP (ref 3.5–5)
PROT SERPL-MCNC: 5.1 G/DL — LOW (ref 6–8)
PROTHROM AB SERPL-ACNC: 11.6 SEC — SIGNIFICANT CHANGE UP (ref 9.95–12.87)
PROTHROM AB SERPL-ACNC: 12.1 SEC — SIGNIFICANT CHANGE UP (ref 9.95–12.87)
RBC # BLD: 2.88 M/UL — LOW (ref 4.2–5.4)
RBC # BLD: 3.13 M/UL — LOW (ref 4.2–5.4)
RBC # FLD: 11.8 % — SIGNIFICANT CHANGE UP (ref 11.5–14.5)
RBC # FLD: 11.9 % — SIGNIFICANT CHANGE UP (ref 11.5–14.5)
SODIUM SERPL-SCNC: 136 MMOL/L — SIGNIFICANT CHANGE UP (ref 135–146)
SODIUM SERPL-SCNC: 136 MMOL/L — SIGNIFICANT CHANGE UP (ref 135–146)
WBC # BLD: 11.26 K/UL — HIGH (ref 4.8–10.8)
WBC # BLD: 12.45 K/UL — HIGH (ref 4.8–10.8)
WBC # FLD AUTO: 11.26 K/UL — HIGH (ref 4.8–10.8)
WBC # FLD AUTO: 12.45 K/UL — HIGH (ref 4.8–10.8)

## 2018-10-06 RX ORDER — LEVETIRACETAM 250 MG/1
1 TABLET, FILM COATED ORAL
Qty: 60 | Refills: 0 | OUTPATIENT
Start: 2018-10-06 | End: 2018-11-04

## 2018-10-06 RX ORDER — OXYCODONE HYDROCHLORIDE 5 MG/1
5 TABLET ORAL ONCE
Qty: 0 | Refills: 0 | Status: DISCONTINUED | OUTPATIENT
Start: 2018-10-06 | End: 2018-10-06

## 2018-10-06 RX ORDER — POTASSIUM CHLORIDE 20 MEQ
20 PACKET (EA) ORAL ONCE
Qty: 0 | Refills: 0 | Status: COMPLETED | OUTPATIENT
Start: 2018-10-06 | End: 2018-10-06

## 2018-10-06 RX ADMIN — AMLODIPINE BESYLATE 5 MILLIGRAM(S): 2.5 TABLET ORAL at 06:46

## 2018-10-06 RX ADMIN — Medication 50 MILLIEQUIVALENT(S): at 06:42

## 2018-10-06 RX ADMIN — OXYCODONE HYDROCHLORIDE 5 MILLIGRAM(S): 5 TABLET ORAL at 03:58

## 2018-10-06 RX ADMIN — OXYCODONE HYDROCHLORIDE 5 MILLIGRAM(S): 5 TABLET ORAL at 03:56

## 2018-10-06 RX ADMIN — LEVETIRACETAM 400 MILLIGRAM(S): 250 TABLET, FILM COATED ORAL at 06:40

## 2018-10-06 RX ADMIN — HEPARIN SODIUM 5000 UNIT(S): 5000 INJECTION INTRAVENOUS; SUBCUTANEOUS at 06:44

## 2018-10-06 RX ADMIN — OXCARBAZEPINE 150 MILLIGRAM(S): 300 TABLET, FILM COATED ORAL at 06:46

## 2018-10-06 NOTE — DISCHARGE NOTE ADULT - ADDITIONAL INSTRUCTIONS
FOLLOW UP:  1. Follow up with Neurology and Orthopedic doctors in 1-2 weeks. Call office for appointment  2. Follow up with PMD   3. Take tylenol and motrin as needed for pain.   4. If experience fever, chest pain, shortness of breath, dizziness, vomiting , bleeding or drainage from wound call MD or return to ED.

## 2018-10-06 NOTE — DISCHARGE NOTE ADULT - CARE PROVIDERS DIRECT ADDRESSES
[email or web address]

## 2018-10-06 NOTE — DISCHARGE NOTE ADULT - CARE PROVIDER_API CALL
Walt Thorne), Surgery  [address]  [address]rd [place] [address]  Phone: [phone]  Fax: [phone]    Abe Kim), Orthopaedic Surgery  [address] [place] [address]  Phone: [phone]  Fax: [phone]    Dee Lebron), Neurology  [address] [place] [address]  Phone: [phone]  Fax: [phone]

## 2018-10-06 NOTE — DOWNTIME INTERRUPTION NOTE - WHICH MANUAL FORMS INITIATED?
Documentation hold for POC and A&I flowsheets and Adult Patient Profile.    See paper record for additional documentation recorded during downtime.

## 2018-10-06 NOTE — DISCHARGE NOTE ADULT - MEDICATION SUMMARY - MEDICATIONS TO TAKE
I will START or STAY ON the medications listed below when I get home from the hospital:    Keppra 250 mg oral tablet  -- 1 tab(s) by mouth every 12 hours x 30 days   -- Check with your doctor before becoming pregnant.  It is very important that you take or use this exactly as directed.  Do not skip doses or discontinue unless directed by your doctor.  May cause drowsiness or dizziness.  Obtain medical advice before taking any non-prescription drugs as some may affect the action of this medication.  Swallow whole.  Do not crush.  This drug may impair the ability to drive or operate machinery.  Use care until you become familiar with its effects.    -- Indication: For Continue home medication as before    Trileptal 150 mg oral tablet  -- take one tablet every 12 hours for 3 days, then increase to one and a half tablet every 12 hours  -- It is very important that you take or use this exactly as directed.  Do not skip doses or discontinue unless directed by your doctor.  May cause drowsiness.  Alcohol may intensify this effect.  Use care when operating dangerous machinery.    -- Indication: For Continue home medication as before    Norvasc 5 mg oral tablet  -- 1 tab(s) by mouth once a day  -- Indication: For Continue home medication as before    fluticasone 50 mcg/inh nasal spray  -- 2 spray(s) into nose once a day  -- Indication: For Continue home medication as before    melatonin 5 mg oral tablet  -- 1 tab(s) by mouth once a day (at bedtime)  -- Indication: For Continue home medication as before    Multiple Vitamins oral capsule  -- 1 cap(s) by mouth once a day  -- Indication: For Continue home medication as before

## 2018-10-06 NOTE — DISCHARGE NOTE ADULT - CARE PLAN
Principal Discharge DX:	Seizure  Goal:	Resolution of symptoms  Assessment and plan of treatment:	Right shoulder dislocation and left humeral fracture s/p seizure

## 2018-10-06 NOTE — DISCHARGE NOTE ADULT - PATIENT PORTAL LINK FT
You can access the [facility] Patient Portal, offered by [facility], by registering with the following website: [email or web address]

## 2018-10-06 NOTE — DISCHARGE NOTE ADULT - HOSPITAL COURSE
63 yo F with h/o seizure, HTN, on fluticasone, melatonin, Keppra, trilepta, norvasc.  She was found down on floor post-ictal by  at home, in EMS car she had seizure, versed was administered. Transfered to [facility], intubated for protection of airway and GCS 3 on admission per report. PanCT  showed no head bleed or solid organ injuries, has b/l proximal humerus fractures and L posterior shoulder dislocation. Vitals stable and ready for discharge. Will follow up with Ortho outpatient and neurology.

## 2018-10-06 NOTE — PROGRESS NOTE ADULT - SUBJECTIVE AND OBJECTIVE BOX
Progress Note: Trauma Surgery  Patient: CORIE CHOI , 62y (1955)Female   MRN: [id number]  Location: [address]  Visit: 10-05-18 Inpatient  Date: 10-06-18 @ 00:19    Hospital Day: 3    Procedure/Injury: seizure with fall     Events over 24h: [place], patient doing well, complaining of some trouble sleeping     Vitals: T(F): 97.2 (10-06-18 @ 00:09), Max: 102.6 (10-05-18 @ 00:28)  HR: 56 (10-06-18 @ 00:09)  BP: 110/55 (10-06-18 @ 00:09) (76/64 - 125/63)  RR: 18 (10-06-18 @ 00:09)  SpO2: 99% (10-05-18 @ 16:00)    Diet: Diet, Regular (10-05-18 @ 13:32)      Bowel function:   Bowel movement []   Flatus []      In:   10-04-18 @ 07:01  -  10-05-18 @ 07:00  --------------------------------------------------------  IN: 837.8 mL    10-05-18 @ 07:01  -  10-06-18 @ 00:19  --------------------------------------------------------  IN: 736 mL      Out:   10-04-18 @ 07:01  -  10-05-18 @ 07:00  --------------------------------------------------------  OUT:    Indwelling Catheter - Urethral: 545 mL  Total OUT: 545 mL      10-05-18 @ 07:01  -  10-06-18 @ 00:19  --------------------------------------------------------  OUT:    Indwelling Catheter - Urethral: 550 mL    Voided: 200 mL  Total OUT: 750 mL        Net:   10-04-18 @ 07:01  -  10-05-18 @ 07:00  --------------------------------------------------------  NET: 292.8 mL    10-05-18 @ 07:01  -  10-06-18 @ 00:19  --------------------------------------------------------  NET: -14 mL      Physical Examination:  General: NAD, lying in bed comfortably   HEENT: NCAT, PETAR, WNL  Heart: S1 S2, No MRG RRR   Lungs: CTABL +BS Equal BL, No WRC  Abdomen: SNDNT. Obese. No guarding or rebound tenderness.   MSK/Extremities: ROM intact BL UE/LE. Normal gait. No significant deformity or joint abnormality.   Skin: Warm/dry, Normal color, No jaundice.   Incisions/Wounds: Dressings in place, clean, dry and intact, no signs of infection/active bleeding/drainage      Medications: [Standing]  amLODIPine   Tablet 5 milliGRAM(s) Oral daily  cefTRIAXone   IVPB 1 Gram(s) IV Intermittent every 24 hours  heparin  Injectable 5000 Unit(s) SubCutaneous every 8 hours  levETIRAcetam  IVPB 500 milliGRAM(s) IV Intermittent every 12 hours  OXcarbazepine Suspension 150 milliGRAM(s) Oral two times a day  sodium chloride 0.9%. 1000 milliLiter(s) (100 mL/Hr) IV Continuous <Continuous>  thiamine 100 milliGRAM(s) Oral daily    Medications:[PRN]    Labs:                        13.1   16.85 )-----------( 190      ( 05 Oct 2018 11:45 )             37.2     10-05    137  |  102  |  25<H>  ----------------------------<  123<H>  4.5   |  18  |  1.7<H>    Ca    8.9      05 Oct 2018 11:45  Mg     3.5     10-04    TPro  7.6  /  Alb  4.9  /  TBili  0.5  /  DBili  x   /  AST  60<H>  /  ALT  71<H>  /  AlkPhos  64  10-04    LIVER FUNCTIONS - ( 04 Oct 2018 21:31 )  Alb: 4.9 g/dL / Pro: 7.6 g/dL / ALK PHOS: 64 U/L / ALT: 71 U/L / AST: 60 U/L / GGT: x           PT/INR - ( 05 Oct 2018 04:55 )   PT: 11.60 sec;   INR: 1.07 ratio         PTT - ( 05 Oct 2018 04:55 )  PTT:30.5 sec  ABG - ( 05 Oct 2018 10:41 )  pH: 7.40  /  pCO2: 28    /  pO2: 119   / HCO3: 17    / Base Excess: -6.3  /  SaO2: 99                CARDIAC MARKERS ( 05 Oct 2018 11:45 )  x     / x     / 3822 U/L / x     / x      CARDIAC MARKERS ( 05 Oct 2018 04:55 )  x     / x     / 3707 U/L / x     / x      CARDIAC MARKERS ( 04 Oct 2018 21:31 )  x     / 0.03 ng/mL / x     / x     / x      CARDIAC MARKERS ( 04 Oct 2018 21:16 )  x     / 0.03 ng/mL / 1936 U/L / x     / 15.6 ng/mL  CARDIAC MARKERS ( 04 Oct 2018 19:20 )  x     / 0.02 ng/mL / x     / x     / x            FiO2: 40, PEEP: 5    Imaging:  None/24h    Assessment:  62y Female patient admitted S/P seizure with fall from standing    Plan:  NPO  HSQ  Nonop mngt per ortho  IV hydration  Keppra        Dispo:  Seen and evaluated by PT: Yes [] No []  Physiatry reccomendations: Home with VNS [  ] , SNF/STR [  ] , Inpatient rehab [  ] , No needs + D/C home [  ]  SW:     Date/Time: 10-06-18 @ 00:19

## 2018-10-07 LAB
CULTURE RESULTS: SIGNIFICANT CHANGE UP
MANUAL DIF COMMENT BLD-IMP: SIGNIFICANT CHANGE UP
SPECIMEN SOURCE: SIGNIFICANT CHANGE UP

## 2018-10-10 LAB
CULTURE RESULTS: SIGNIFICANT CHANGE UP
SPECIMEN SOURCE: SIGNIFICANT CHANGE UP

## 2018-10-11 LAB
CULTURE RESULTS: SIGNIFICANT CHANGE UP
SPECIMEN SOURCE: SIGNIFICANT CHANGE UP

## 2018-10-12 DIAGNOSIS — S42.202A UNSPECIFIED FRACTURE OF UPPER END OF LEFT HUMERUS, INITIAL ENCOUNTER FOR CLOSED FRACTURE: ICD-10-CM

## 2018-10-12 DIAGNOSIS — E87.5 HYPERKALEMIA: ICD-10-CM

## 2018-10-12 DIAGNOSIS — S43.025A POSTERIOR DISLOCATION OF LEFT HUMERUS, INITIAL ENCOUNTER: ICD-10-CM

## 2018-10-12 DIAGNOSIS — R40.2433 GLASGOW COMA SCALE SCORE 3-8, AT HOSPITAL ADMISSION: ICD-10-CM

## 2018-10-12 DIAGNOSIS — G40.901 EPILEPSY, UNSPECIFIED, NOT INTRACTABLE, WITH STATUS EPILEPTICUS: ICD-10-CM
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
## 2018-10-12 DIAGNOSIS — R41.82 ALTERED MENTAL STATUS, UNSPECIFIED: ICD-10-CM
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
## 2018-10-12 DIAGNOSIS — I10 ESSENTIAL (PRIMARY) HYPERTENSION: ICD-10-CM

## 2018-10-12 DIAGNOSIS — S42.201A UNSPECIFIED FRACTURE OF UPPER END OF RIGHT HUMERUS, INITIAL ENCOUNTER FOR CLOSED FRACTURE: ICD-10-CM
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
## 2019-01-18 ENCOUNTER — EMERGENCY (EMERGENCY)
Facility: HOSPITAL | Age: 64
LOS: 0 days | Discharge: HOME | End: 2019-01-19
Attending: EMERGENCY MEDICINE | Admitting: EMERGENCY MEDICINE

## 2019-01-18 VITALS
SYSTOLIC BLOOD PRESSURE: 136 MMHG | DIASTOLIC BLOOD PRESSURE: 76 MMHG | HEART RATE: 78 BPM | TEMPERATURE: 98 F | OXYGEN SATURATION: 98 % | RESPIRATION RATE: 17 BRPM

## 2019-01-18 DIAGNOSIS — Z98.890 OTHER SPECIFIED POSTPROCEDURAL STATES: ICD-10-CM

## 2019-01-18 DIAGNOSIS — S00.81XA ABRASION OF OTHER PART OF HEAD, INITIAL ENCOUNTER: ICD-10-CM
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
## 2019-01-18 DIAGNOSIS — I10 ESSENTIAL (PRIMARY) HYPERTENSION: ICD-10-CM

## 2019-01-18 DIAGNOSIS — Z90.49 ACQUIRED ABSENCE OF OTHER SPECIFIED PARTS OF DIGESTIVE TRACT: Chronic | ICD-10-CM
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
## 2019-01-18 DIAGNOSIS — Z79.899 OTHER LONG TERM (CURRENT) DRUG THERAPY: ICD-10-CM

## 2019-01-18 DIAGNOSIS — G40.909 EPILEPSY, UNSPECIFIED, NOT INTRACTABLE, WITHOUT STATUS EPILEPTICUS: ICD-10-CM

## 2019-01-18 DIAGNOSIS — S07.1XXS: Chronic | ICD-10-CM

## 2019-01-18 DIAGNOSIS — Z98.82 BREAST IMPLANT STATUS: Chronic | ICD-10-CM

## 2019-01-18 DIAGNOSIS — S22.39XA FRACTURE OF ONE RIB, UNSPECIFIED SIDE, INITIAL ENCOUNTER FOR CLOSED FRACTURE: Chronic | ICD-10-CM

## 2019-01-18 DIAGNOSIS — R56.9 UNSPECIFIED CONVULSIONS: ICD-10-CM

## 2019-01-18 DIAGNOSIS — Z98.82 BREAST IMPLANT STATUS: ICD-10-CM
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
## 2019-01-18 DIAGNOSIS — Z90.49 ACQUIRED ABSENCE OF OTHER SPECIFIED PARTS OF DIGESTIVE TRACT: ICD-10-CM

## 2019-01-18 LAB
ALBUMIN SERPL ELPH-MCNC: 4.9 G/DL — SIGNIFICANT CHANGE UP (ref 3.5–5.2)
ALP SERPL-CCNC: 63 U/L — SIGNIFICANT CHANGE UP (ref 30–115)
ALT FLD-CCNC: 29 U/L — SIGNIFICANT CHANGE UP (ref 0–41)
ANION GAP SERPL CALC-SCNC: 15 MMOL/L — HIGH (ref 7–14)
AST SERPL-CCNC: 33 U/L — SIGNIFICANT CHANGE UP (ref 0–41)
BASE EXCESS BLDV CALC-SCNC: 1.4 MMOL/L — SIGNIFICANT CHANGE UP (ref -2–2)
BASOPHILS # BLD AUTO: 0.01 K/UL — SIGNIFICANT CHANGE UP (ref 0–0.2)
BASOPHILS NFR BLD AUTO: 0.1 % — SIGNIFICANT CHANGE UP (ref 0–1)
BILIRUB SERPL-MCNC: 0.7 MG/DL — SIGNIFICANT CHANGE UP (ref 0.2–1.2)
BUN SERPL-MCNC: 13 MG/DL — SIGNIFICANT CHANGE UP (ref 10–20)
CA-I SERPL-SCNC: 1.16 MMOL/L — SIGNIFICANT CHANGE UP (ref 1.12–1.3)
CALCIUM SERPL-MCNC: 9 MG/DL — SIGNIFICANT CHANGE UP (ref 8.5–10.1)
CHLORIDE SERPL-SCNC: 100 MMOL/L — SIGNIFICANT CHANGE UP (ref 98–110)
CO2 SERPL-SCNC: 23 MMOL/L — SIGNIFICANT CHANGE UP (ref 17–32)
CREAT SERPL-MCNC: 0.7 MG/DL — SIGNIFICANT CHANGE UP (ref 0.7–1.5)
EOSINOPHIL # BLD AUTO: 0 K/UL — SIGNIFICANT CHANGE UP (ref 0–0.7)
EOSINOPHIL NFR BLD AUTO: 0 % — SIGNIFICANT CHANGE UP (ref 0–8)
GAS PNL BLDV: 139 MMOL/L — SIGNIFICANT CHANGE UP (ref 136–145)
GAS PNL BLDV: SIGNIFICANT CHANGE UP
GLUCOSE SERPL-MCNC: 121 MG/DL — HIGH (ref 70–99)
HCO3 BLDV-SCNC: 27 MMOL/L — SIGNIFICANT CHANGE UP (ref 22–29)
HCT VFR BLD CALC: 44.2 % — SIGNIFICANT CHANGE UP (ref 37–47)
HCT VFR BLDA CALC: 47.5 % — HIGH (ref 34–44)
HGB BLD CALC-MCNC: 15.5 G/DL — SIGNIFICANT CHANGE UP (ref 14–18)
HGB BLD-MCNC: 15.6 G/DL — SIGNIFICANT CHANGE UP (ref 12–16)
HOROWITZ INDEX BLDV+IHG-RTO: 21 — SIGNIFICANT CHANGE UP
IMM GRANULOCYTES NFR BLD AUTO: 0.5 % — HIGH (ref 0.1–0.3)
LACTATE BLDV-MCNC: 1.7 MMOL/L — HIGH (ref 0.5–1.6)
LYMPHOCYTES # BLD AUTO: 12.9 % — LOW (ref 20.5–51.1)
LYMPHOCYTES # BLD AUTO: 2.25 K/UL — SIGNIFICANT CHANGE UP (ref 1.2–3.4)
MCHC RBC-ENTMCNC: 32 PG — HIGH (ref 27–31)
MCHC RBC-ENTMCNC: 35.3 G/DL — SIGNIFICANT CHANGE UP (ref 32–37)
MCV RBC AUTO: 90.8 FL — SIGNIFICANT CHANGE UP (ref 81–99)
MONOCYTES # BLD AUTO: 0.49 K/UL — SIGNIFICANT CHANGE UP (ref 0.1–0.6)
MONOCYTES NFR BLD AUTO: 2.8 % — SIGNIFICANT CHANGE UP (ref 1.7–9.3)
NEUTROPHILS # BLD AUTO: 14.61 K/UL — HIGH (ref 1.4–6.5)
NEUTROPHILS NFR BLD AUTO: 83.7 % — HIGH (ref 42.2–75.2)
NRBC # BLD: 0 /100 WBCS — SIGNIFICANT CHANGE UP (ref 0–0)
PCO2 BLDV: 43 MMHG — SIGNIFICANT CHANGE UP (ref 41–51)
PH BLDV: 7.4 — SIGNIFICANT CHANGE UP (ref 7.26–7.43)
PLATELET # BLD AUTO: 250 K/UL — SIGNIFICANT CHANGE UP (ref 130–400)
PO2 BLDV: 20 MMHG — SIGNIFICANT CHANGE UP (ref 20–40)
POTASSIUM BLDV-SCNC: 3.9 MMOL/L — SIGNIFICANT CHANGE UP (ref 3.3–5.6)
POTASSIUM SERPL-MCNC: 4.4 MMOL/L — SIGNIFICANT CHANGE UP (ref 3.5–5)
POTASSIUM SERPL-SCNC: 4.4 MMOL/L — SIGNIFICANT CHANGE UP (ref 3.5–5)
PROT SERPL-MCNC: 7.5 G/DL — SIGNIFICANT CHANGE UP (ref 6–8)
RBC # BLD: 4.87 M/UL — SIGNIFICANT CHANGE UP (ref 4.2–5.4)
RBC # FLD: 12.7 % — SIGNIFICANT CHANGE UP (ref 11.5–14.5)
SAO2 % BLDV: 31 % — SIGNIFICANT CHANGE UP
SODIUM SERPL-SCNC: 138 MMOL/L — SIGNIFICANT CHANGE UP (ref 135–146)
WBC # BLD: 17.44 K/UL — HIGH (ref 4.8–10.8)
WBC # FLD AUTO: 17.44 K/UL — HIGH (ref 4.8–10.8)

## 2019-01-18 RX ORDER — LEVETIRACETAM 250 MG/1
500 TABLET, FILM COATED ORAL ONCE
Qty: 0 | Refills: 0 | Status: COMPLETED | OUTPATIENT
Start: 2019-01-18 | End: 2019-01-18

## 2019-01-18 RX ORDER — SODIUM CHLORIDE 9 MG/ML
1000 INJECTION INTRAMUSCULAR; INTRAVENOUS; SUBCUTANEOUS ONCE
Qty: 0 | Refills: 0 | Status: COMPLETED | OUTPATIENT
Start: 2019-01-18 | End: 2019-01-18

## 2019-01-18 RX ORDER — ACETAMINOPHEN 500 MG
650 TABLET ORAL ONCE
Qty: 0 | Refills: 0 | Status: COMPLETED | OUTPATIENT
Start: 2019-01-18 | End: 2019-01-18

## 2019-01-18 RX ADMIN — LEVETIRACETAM 400 MILLIGRAM(S): 250 TABLET, FILM COATED ORAL at 22:20

## 2019-01-18 RX ADMIN — Medication 650 MILLIGRAM(S): at 23:28

## 2019-01-18 RX ADMIN — SODIUM CHLORIDE 2000 MILLILITER(S): 9 INJECTION INTRAMUSCULAR; INTRAVENOUS; SUBCUTANEOUS at 22:20

## 2019-01-18 RX ADMIN — Medication 650 MILLIGRAM(S): at 22:18

## 2019-01-18 RX ADMIN — SODIUM CHLORIDE 2000 MILLILITER(S): 9 INJECTION INTRAMUSCULAR; INTRAVENOUS; SUBCUTANEOUS at 23:28

## 2019-01-18 NOTE — ED PROVIDER NOTE - PHYSICAL EXAMINATION
CONSTITUTIONAL: Well-appearing; well-nourished; in no apparent distress.   EYES: PERRLA; EOM intact, no nystagmus  NECK: No c spine tenderness. Full rom of cervical spine  CARDIOVASCULAR: Normal S1, S2; no murmurs, rubs, or gallops.   RESPIRATORY: Normal chest excursion with respiration; breath sounds clear and equal bilaterally; no wheezes, rhonchi, or rales.  GI/: Normal bowel sounds; non-distended; non-tender; no palpable organomegaly.   MS: No evidence of trauma or deformity. No spinal tenderness. Stable pelvis. Normal ROM in all four extremities; non-tender to palpation  SKIN: + multiple abrasions to face. No lacerations noted.   NEURO/PSYCH: A & O x 4; grossly unremarkable. mood and manner are appropriate. Grooming and personal hygiene are appropriate.

## 2019-01-18 NOTE — ED PROVIDER NOTE - NSPTACCESSSVCSAPPTDETAILS_ED_ALL_ED_FT
Please follow up with your primary care doctor within 1-2 days.  Please follow up with your neurologist within 1-2 days.

## 2019-01-18 NOTE — ED PROVIDER NOTE - NS ED ROS FT
Constitutional: no fever, chills, no recent weight loss, change in appetite or malaise  Eyes: no redness/discharge/pain/vision changes  Cardiac: No chest pain, SOB or edema.  Respiratory: No cough or respiratory distress  GI: No nausea, vomiting, diarrhea or abdominal pain.  : No dysuria, frequency, urgency or hematuria  MS: no pain to back or extremities, no loss of ROM, no weakness  Neuro: + seizure and HA  Skin: + mult abrasions to face  Endocrine: No history of thyroid disease or diabetes.  Except as documented in the HPI, all other systems are negative.

## 2019-01-18 NOTE — ED PROVIDER NOTE - OBJECTIVE STATEMENT
63 year old female with pmhx of HTN, Seizure d/o on keppra 500mg BID biba after seizure. Pts last seizure was in October. Sts she did not take her Keppra today. Pt follows with Dr. Nicolas Watson. Pts  found her in bed having seizure x 2 minutes. Pt is presenting with mult abrasions to face, she does not remember hitting her head. Now pt c/o mild HA. Denies any visual changes, dizziness, tongue biting, urinary incontinence, inability to ambulate, n/v, chest pain/sob.

## 2019-01-18 NOTE — ED PROVIDER NOTE - PROGRESS NOTE DETAILS
Patient has improved at this time now asymptomatic, ct head negative, labs negative I offered admission but patient preferred to be discharged.

## 2019-01-18 NOTE — ED PROVIDER NOTE - MEDICAL DECISION MAKING DETAILS
Patient improved asking to be discharged at this time. ct head negative, labs normal I will discharge.

## 2019-01-18 NOTE — ED PROVIDER NOTE - ATTENDING CONTRIBUTION TO CARE
I was present for and supervised the key and critical aspects of the procedures performed during the care of the patient. Patient presents after a seizure, she had a full tonic clonic seizure approx 90 min prior to arrival in the Ed it was witnessed by her  who states that this is her typical seizure activity the patient notes that she did not take her keppra dose today in addition has had nausea and has felt like she has a "cold".  she denies any headache at this time, she denies any visual changes, chest pain or shortness of breath, she  denies any chest pain or shortness of breath she denies any abdominal or back pain. she denies any skin changes  A/P- patient stable here in the Ed she she was given iv fluids weo btained labs and ct scan which are negative the patient improved I will continue to monitor for any further seizure activity.

## 2019-01-18 NOTE — ED PROVIDER NOTE - PSH
Breast implant status    Crushing injury of skull, sequela    History of appendectomy    Rib fractures

## 2019-01-19 VITALS
HEART RATE: 77 BPM | OXYGEN SATURATION: 95 % | RESPIRATION RATE: 18 BRPM | DIASTOLIC BLOOD PRESSURE: 72 MMHG | SYSTOLIC BLOOD PRESSURE: 120 MMHG | TEMPERATURE: 101 F

## 2019-01-19 NOTE — ED ADULT NURSE NOTE - NSIMPLEMENTINTERV_GEN_ALL_ED
Implemented All Universal Safety Interventions:  [place] to call system. Call bell, personal items and telephone within reach. Instruct patient to call for assistance. Room bathroom lighting operational. Non-slip footwear when patient is off stretcher. Physically safe environment: no spills, clutter or unnecessary equipment. Stretcher in lowest position, wheels locked, appropriate side rails in place.

## 2019-01-21 LAB — LEVETIRACETAM SERPL-MCNC: <2 MCG/ML — LOW (ref 12–46)

## 2019-03-08 ENCOUNTER — RX RENEWAL (OUTPATIENT)
Age: 64
End: 2019-03-08

## 2019-04-04 ENCOUNTER — APPOINTMENT (OUTPATIENT)
Dept: HEART AND VASCULAR | Facility: CLINIC | Age: 64
End: 2019-04-04

## 2019-05-14 ENCOUNTER — RECORD ABSTRACTING (OUTPATIENT)
Age: 64
End: 2019-05-14

## 2019-05-14 DIAGNOSIS — F19.10 OTHER PSYCHOACTIVE SUBSTANCE ABUSE, UNCOMPLICATED: ICD-10-CM

## 2019-05-14 DIAGNOSIS — Z82.61 FAMILY HISTORY OF ARTHRITIS: ICD-10-CM

## 2019-05-14 DIAGNOSIS — G40.409 OTHER GENERALIZED EPILEPSY AND EPILEPTIC SYNDROMES, NOT INTRACTABLE, W/OUT STATUS EPILEPTICUS: ICD-10-CM

## 2019-05-14 DIAGNOSIS — S49.90XA UNSPECIFIED INJURY OF SHOULDER AND UPPER ARM, UNSPECIFIED ARM, INITIAL ENCOUNTER: ICD-10-CM

## 2019-05-23 ENCOUNTER — OTHER (OUTPATIENT)
Age: 64
End: 2019-05-23

## 2019-05-23 ENCOUNTER — RX RENEWAL (OUTPATIENT)
Age: 64
End: 2019-05-23

## 2019-06-10 ENCOUNTER — APPOINTMENT (OUTPATIENT)
Dept: NEUROLOGY | Facility: CLINIC | Age: 64
End: 2019-06-10

## 2019-07-18 ENCOUNTER — APPOINTMENT (OUTPATIENT)
Dept: NEUROLOGY | Facility: CLINIC | Age: 64
End: 2019-07-18
Payer: COMMERCIAL

## 2019-07-18 VITALS
DIASTOLIC BLOOD PRESSURE: 94 MMHG | WEIGHT: 170 LBS | SYSTOLIC BLOOD PRESSURE: 154 MMHG | TEMPERATURE: 96.3 F | HEART RATE: 71 BPM | HEIGHT: 67 IN | BODY MASS INDEX: 26.68 KG/M2 | OXYGEN SATURATION: 98 %

## 2019-07-18 DIAGNOSIS — Z87.891 PERSONAL HISTORY OF NICOTINE DEPENDENCE: ICD-10-CM

## 2019-07-18 PROCEDURE — 99214 OFFICE O/P EST MOD 30 MIN: CPT

## 2019-07-18 RX ORDER — LEVETIRACETAM 750 MG/1
750 TABLET, FILM COATED ORAL
Refills: 0 | Status: DISCONTINUED | COMMUNITY
End: 2019-07-18

## 2019-07-18 NOTE — DISCUSSION/SUMMARY
[FreeTextEntry1] : Mrs. Garcia is here for follow-up of her seizures and now appears to be well controlled on keppra 1000mg BID.  Her last level is appropriate and if she requires shoulder surgery would clear her but will need to take her keppra regularly and if need can take same dose IV.\par 1. Will repeat aEEG in 2-3 months\par 2. Continue keppra 1000mg BID and vitamin b6 100mg BID\par 3. If no issues f/u in 6 months

## 2019-07-18 NOTE — PHYSICAL EXAM
[FreeTextEntry1] : She is alert and oriented to person, place, and time.  Language and attention are normal.\par Cranial nerves, cranial nerves II through XII are normal.\par Motor, power is 5/5 in the extremities except there is limited range of motion in the left shoulder secondary to pain.\par Sensory, pinprick, temperature, and vibration are normal and symmetric throughout.\par Deep tendon reflexes, reflexes are 2+ in the right upper extremity, 1+ in the left upper extremity, and 2+ patellae bilaterally.\par Cerebellar, finger-to-nose is normal except limited range of motion in the left arm.\par Gait, gait appears to be normal.\par

## 2019-07-18 NOTE — HISTORY OF PRESENT ILLNESS
[FreeTextEntry1] : Ms. Garcia is a 63-year-old woman last seen by me on 2/14/2019 for follow-up of her seizures.  She was on Trileptal and Topamax.  She had a seizure in October, injured her left shoulder and was restarted on Keppra 500 mg b.i.d.  She had another seizure and was in the hospital Keppra level was less than 2.  She states she was taking it; however, there was no level detectable.  Keppra was increased to 750 mg b.i.d.  I spoke with orthopedic surgeon Dr. Kim that I will not be able to clear her for surgery and guarantee that there is a lower chance of seizures until I was able to prove that she was reliably taking her Keppra.  \par Since last visit she has been on keppra 1000mg BID and her last level was 29.3.  She has not had any seizures since last visit.  She still has pain in left shoulder but she thinks it is slightly better.  No new medical issues

## 2019-07-18 NOTE — REVIEW OF SYSTEMS
[Arthralgias] : arthralgias [Joint Pain] : joint pain [Joint Swelling] : joint swelling [Negative] : Heme/Lymph

## 2019-09-02 PROBLEM — I36.1 NON-RHEUMATIC TRICUSPID VALVE INSUFFICIENCY: Status: ACTIVE | Noted: 2018-02-27

## 2019-11-07 ENCOUNTER — OTHER (OUTPATIENT)
Age: 64
End: 2019-11-07

## 2020-01-14 ENCOUNTER — APPOINTMENT (OUTPATIENT)
Dept: NEUROLOGY | Facility: CLINIC | Age: 65
End: 2020-01-14
Payer: COMMERCIAL

## 2020-01-14 VITALS
HEIGHT: 67 IN | SYSTOLIC BLOOD PRESSURE: 148 MMHG | BODY MASS INDEX: 27.47 KG/M2 | WEIGHT: 175 LBS | HEART RATE: 65 BPM | DIASTOLIC BLOOD PRESSURE: 86 MMHG

## 2020-01-14 DIAGNOSIS — Z82.3 FAMILY HISTORY OF STROKE: ICD-10-CM

## 2020-01-14 PROCEDURE — 99213 OFFICE O/P EST LOW 20 MIN: CPT

## 2020-01-14 NOTE — DISCUSSION/SUMMARY
[FreeTextEntry1] : Patient is a 65yo woman with epilepsy currently well controlled and levels good.\par 1. Continue keppra \par 2. Check routine blood work\par 3. f/u orthopedics\par 4. f/u in 6 months\par 5. OK for orthopedic surgery if needed

## 2020-01-14 NOTE — REVIEW OF SYSTEMS
[As Noted in HPI] : as noted in HPI [Anxiety] : anxiety [Nasal Discharge] : nasal discharge [Negative] : Gastrointestinal

## 2020-01-14 NOTE — HISTORY OF PRESENT ILLNESS
[FreeTextEntry1] : Ms. Garcia is a 63yo woman last seen by me 7/18/2019 for her seizures.  Her last seizure was October 2018 and has not had any seizures since then.  Had a drink this weekend and has been feeling poor since then.  She takes extra 250 keppra intermittently if she feels unwell.  She is anxious about seizures.

## 2020-01-14 NOTE — PHYSICAL EXAM
[FreeTextEntry1] : She is alert and oriented to person, place, and time. Language and attention are normal.\par Cranial nerves, cranial nerves II through XII are normal.\par Motor, power is 5/5 in the extremities except there is limited range of motion in the left shoulder secondary to pain.\par Sensory, pinprick, temperature, and vibration are normal and symmetric throughout.\par Deep tendon reflexes, reflexes are 2+ in the right upper extremity, 1+ in the left upper extremity, and 2+ patellae bilaterally.\par Cerebellar, finger-to-nose is normal except limited range of motion in the left arm.\par Gait, gait appears to be normal.

## 2020-04-28 ENCOUNTER — RX RENEWAL (OUTPATIENT)
Age: 65
End: 2020-04-28

## 2020-05-05 ENCOUNTER — RX RENEWAL (OUTPATIENT)
Age: 65
End: 2020-05-05

## 2020-06-08 ENCOUNTER — APPOINTMENT (OUTPATIENT)
Dept: NEUROLOGY | Facility: CLINIC | Age: 65
End: 2020-06-08

## 2020-06-08 ENCOUNTER — APPOINTMENT (OUTPATIENT)
Dept: NEUROLOGY | Facility: CLINIC | Age: 65
End: 2020-06-08
Payer: MEDICARE

## 2020-06-08 VITALS
SYSTOLIC BLOOD PRESSURE: 146 MMHG | BODY MASS INDEX: 26.52 KG/M2 | TEMPERATURE: 97.6 F | HEART RATE: 63 BPM | OXYGEN SATURATION: 98 % | DIASTOLIC BLOOD PRESSURE: 85 MMHG | WEIGHT: 165 LBS | HEIGHT: 66 IN

## 2020-06-08 PROCEDURE — 99214 OFFICE O/P EST MOD 30 MIN: CPT

## 2020-06-08 NOTE — HISTORY OF PRESENT ILLNESS
[FreeTextEntry1] : Ms. Garcia is a 65yo woman last seen by me in the office on 1/14/2020 and spoke too on the phone on 5/13/2020.  When I spoke with her in May she had had a seizure recnetly after drinking very heavily the night before.  She admitted to drinking heavily again and was taking extra keppra when she would drink to try and prevent a seizure.  This likely explains why her keppra level suddenly jumped up from the high 20's to the 50's.  I counseled her on her drinking.  She had another seizure after drinking again for the Holiday (Memorial day).  I once again counseled her that drinking increases her risk of seizures.  When she drinks she gets "Drunk"\par Her last seizure she hurt her left shoulder.

## 2020-06-08 NOTE — PHYSICAL EXAM
[FreeTextEntry1] : a+Ox3 langage and attention normal\par CN 2-12 normal\par No drift power 5/5 except left shoulder secondary to pain \par FTN NL\par sensory symmetric\par gait normal

## 2020-06-08 NOTE — REVIEW OF SYSTEMS
[As Noted in HPI] : as noted in HPI [Seizures] : convulsions [Anxiety] : anxiety [Depression] : depression [Arthralgias] : arthralgias [Joint Pain] : joint pain [Joint Stiffness] : joint stiffness [Negative] : Respiratory

## 2020-06-08 NOTE — DISCUSSION/SUMMARY
[FreeTextEntry1] : Ms. Garcia is a 63yo woman with epilepsy which was well controlled in the past on keppra 1000mg BID however since she started drinking heavily again she has started to have seizures.  I discussed with her that as long as she is drinking she should not be driving and she would need atleast 1-3 months\par 1. Continue keppra 1000mg BID\par 2. Vitamin b6 100mg BID\par 3. Alcohol cessation\par 4. f/u PMD on liver enzymes\par 5. F/u with ortho for left shoulder6. f/u here in 3-6 month\par 6. KLP 0.25mg ODT PRN warning of seizure (which she says she gets a fear and anxiety usually 1-2 minutes before a seizure)

## 2020-06-10 ENCOUNTER — APPOINTMENT (OUTPATIENT)
Dept: NEUROLOGY | Facility: CLINIC | Age: 65
End: 2020-06-10

## 2020-07-08 DIAGNOSIS — F41.9 ANXIETY DISORDER, UNSPECIFIED: ICD-10-CM

## 2020-12-07 ENCOUNTER — RX RENEWAL (OUTPATIENT)
Age: 65
End: 2020-12-07

## 2020-12-17 ENCOUNTER — APPOINTMENT (OUTPATIENT)
Dept: NEUROLOGY | Facility: CLINIC | Age: 65
End: 2020-12-17
Payer: COMMERCIAL

## 2020-12-17 DIAGNOSIS — G40.909 EPILEPSY, UNSPECIFIED, NOT INTRACTABLE, W/OUT STATUS EPILEPTICUS: ICD-10-CM

## 2020-12-17 PROCEDURE — 99214 OFFICE O/P EST MOD 30 MIN: CPT | Mod: 95

## 2020-12-17 NOTE — HISTORY OF PRESENT ILLNESS
[FreeTextEntry1] : Ms. Garcia is a 66yo woman being followed by me for her hisotry of seizures.  She has had seizures after drinking heavily and on the last visit I counseled her about avoiding alcohol.  On this visit she admits that she is still drinking and drank as recent as yesterday and feels very "jittery" today.  I once again counseled about alcohol use and her seizures.  She is seeing a rheumatologist and was started on hydroxychloroquine and prednisone.  She also start lexapro which helped her feel better but stopped it because her pharmacist said it could interact with the hydroxychloroquine

## 2020-12-17 NOTE — REVIEW OF SYSTEMS
[Feeling Poorly] : feeling poorly [As Noted in HPI] : as noted in HPI [Anxiety] : anxiety [Depression] : depression [Negative] : Respiratory

## 2020-12-17 NOTE — PHYSICAL EXAM
[FreeTextEntry1] : limited exam on telehealth\par A+Ox3 language and attention normal\par No facial EOMI\par Movements symmetric\par

## 2020-12-17 NOTE — DISCUSSION/SUMMARY
[FreeTextEntry1] : Ms. Garcia is a 66yo woman with seizures and alcohol abuse.  I counseled her again about risk of seizures and serious injury with alcohol use.  She says she will stop drinking by the end of the year.\par 1. Continue keppra\par 2. Alcohol cessation\par 3. f/u with Rheumatologist\par 4. Check keppra levels for compliance\par

## 2021-04-29 NOTE — PATIENT PROFILE ADULT. - NS PRO CONTRA FLU 1
Pt is a 25y/o male presents today for eval of right hamstring discomfort x 2 weeks that has been constant with no aggravating/alleviating factors. Pt denies fever, chills, weakness, numbness, trauma, SOB, CP, Palpitations
24-Feb-2021
out of season (available sept 1 thru apr 2 only)

## 2021-08-25 ENCOUNTER — RX RENEWAL (OUTPATIENT)
Age: 66
End: 2021-08-25

## 2021-09-20 ENCOUNTER — RX RENEWAL (OUTPATIENT)
Age: 66
End: 2021-09-20

## 2021-10-11 ENCOUNTER — APPOINTMENT (OUTPATIENT)
Dept: HEART AND VASCULAR | Facility: CLINIC | Age: 66
End: 2021-10-11

## 2021-11-17 ENCOUNTER — APPOINTMENT (OUTPATIENT)
Dept: HEART AND VASCULAR | Facility: CLINIC | Age: 66
End: 2021-11-17

## 2021-12-03 ENCOUNTER — RX RENEWAL (OUTPATIENT)
Age: 66
End: 2021-12-03

## 2022-01-04 ENCOUNTER — RX RENEWAL (OUTPATIENT)
Age: 67
End: 2022-01-04

## 2022-01-18 ENCOUNTER — RX RENEWAL (OUTPATIENT)
Age: 67
End: 2022-01-18

## 2022-03-29 ENCOUNTER — RX RENEWAL (OUTPATIENT)
Age: 67
End: 2022-03-29

## 2022-06-29 DIAGNOSIS — R42 DIZZINESS AND GIDDINESS: ICD-10-CM

## 2022-07-13 ENCOUNTER — RX RENEWAL (OUTPATIENT)
Age: 67
End: 2022-07-13

## 2023-01-16 ENCOUNTER — RX RENEWAL (OUTPATIENT)
Age: 68
End: 2023-01-16

## 2023-01-30 ENCOUNTER — RX RENEWAL (OUTPATIENT)
Age: 68
End: 2023-01-30

## 2023-03-07 ENCOUNTER — RX RENEWAL (OUTPATIENT)
Age: 68
End: 2023-03-07

## 2023-08-14 ENCOUNTER — RX RENEWAL (OUTPATIENT)
Age: 68
End: 2023-08-14

## 2023-09-14 ENCOUNTER — RX RENEWAL (OUTPATIENT)
Age: 68
End: 2023-09-14

## 2023-10-17 ENCOUNTER — APPOINTMENT (OUTPATIENT)
Dept: NEUROLOGY | Facility: CLINIC | Age: 68
End: 2023-10-17

## 2023-10-18 ENCOUNTER — RX RENEWAL (OUTPATIENT)
Age: 68
End: 2023-10-18

## 2023-10-19 ENCOUNTER — APPOINTMENT (OUTPATIENT)
Dept: NEUROLOGY | Facility: CLINIC | Age: 68
End: 2023-10-19
Payer: COMMERCIAL

## 2023-10-19 PROCEDURE — 99442: CPT

## 2023-11-10 ENCOUNTER — RX RENEWAL (OUTPATIENT)
Age: 68
End: 2023-11-10

## 2023-12-04 ENCOUNTER — RX RENEWAL (OUTPATIENT)
Age: 68
End: 2023-12-04

## 2024-01-19 ENCOUNTER — RX RENEWAL (OUTPATIENT)
Age: 69
End: 2024-01-19

## 2024-02-22 ENCOUNTER — RX RENEWAL (OUTPATIENT)
Age: 69
End: 2024-02-22

## 2024-02-27 RX ORDER — LEVETIRACETAM 1000 MG/1
1000 TABLET, FILM COATED ORAL
Qty: 180 | Refills: 3 | Status: ACTIVE | COMMUNITY
Start: 2022-03-29 | End: 1900-01-01

## 2024-05-01 ENCOUNTER — RX RENEWAL (OUTPATIENT)
Age: 69
End: 2024-05-01

## 2024-06-05 ENCOUNTER — RX RENEWAL (OUTPATIENT)
Age: 69
End: 2024-06-05

## 2024-06-05 RX ORDER — CLONAZEPAM 0.25 MG/1
0.25 TABLET, ORALLY DISINTEGRATING ORAL
Qty: 30 | Refills: 0 | Status: ACTIVE | COMMUNITY
Start: 2020-06-08 | End: 1900-01-01

## 2024-08-15 ENCOUNTER — RX RENEWAL (OUTPATIENT)
Age: 69
End: 2024-08-15

## 2024-09-19 ENCOUNTER — RX RENEWAL (OUTPATIENT)
Age: 69
End: 2024-09-19

## 2024-10-25 ENCOUNTER — RX RENEWAL (OUTPATIENT)
Age: 69
End: 2024-10-25

## 2025-01-08 ENCOUNTER — APPOINTMENT (OUTPATIENT)
Dept: NEUROLOGY | Facility: CLINIC | Age: 70
End: 2025-01-08
Payer: COMMERCIAL

## 2025-01-08 ENCOUNTER — NON-APPOINTMENT (OUTPATIENT)
Age: 70
End: 2025-01-08

## 2025-01-08 VITALS
HEART RATE: 76 BPM | OXYGEN SATURATION: 99 % | HEIGHT: 66 IN | SYSTOLIC BLOOD PRESSURE: 156 MMHG | WEIGHT: 132 LBS | DIASTOLIC BLOOD PRESSURE: 89 MMHG | BODY MASS INDEX: 21.21 KG/M2

## 2025-01-08 DIAGNOSIS — G40.909 EPILEPSY, UNSPECIFIED, NOT INTRACTABLE, W/OUT STATUS EPILEPTICUS: ICD-10-CM

## 2025-01-08 PROCEDURE — G2211 COMPLEX E/M VISIT ADD ON: CPT | Mod: NC

## 2025-01-08 PROCEDURE — 99205 OFFICE O/P NEW HI 60 MIN: CPT

## 2025-02-19 ENCOUNTER — RX RENEWAL (OUTPATIENT)
Age: 70
End: 2025-02-19

## 2025-04-09 ENCOUNTER — APPOINTMENT (OUTPATIENT)
Dept: NEUROLOGY | Facility: CLINIC | Age: 70
End: 2025-04-09
Payer: COMMERCIAL

## 2025-04-09 PROCEDURE — 95816 EEG AWAKE AND DROWSY: CPT

## 2025-04-22 ENCOUNTER — APPOINTMENT (OUTPATIENT)
Age: 70
End: 2025-04-22

## 2025-06-03 ENCOUNTER — INPATIENT (INPATIENT)
Facility: HOSPITAL | Age: 70
LOS: 0 days | Discharge: HOME | DRG: 101 | End: 2025-06-04
Attending: FAMILY MEDICINE | Admitting: PSYCHIATRY & NEUROLOGY
Payer: COMMERCIAL

## 2025-06-03 VITALS
TEMPERATURE: 98 F | SYSTOLIC BLOOD PRESSURE: 120 MMHG | HEART RATE: 77 BPM | DIASTOLIC BLOOD PRESSURE: 73 MMHG | RESPIRATION RATE: 16 BRPM | OXYGEN SATURATION: 95 %

## 2025-06-03 DIAGNOSIS — G40.909 EPILEPSY, UNSPECIFIED, NOT INTRACTABLE, WITHOUT STATUS EPILEPTICUS: ICD-10-CM

## 2025-06-03 DIAGNOSIS — Z98.82 BREAST IMPLANT STATUS: Chronic | ICD-10-CM

## 2025-06-03 DIAGNOSIS — S22.39XA FRACTURE OF ONE RIB, UNSPECIFIED SIDE, INITIAL ENCOUNTER FOR CLOSED FRACTURE: Chronic | ICD-10-CM

## 2025-06-03 DIAGNOSIS — Z90.49 ACQUIRED ABSENCE OF OTHER SPECIFIED PARTS OF DIGESTIVE TRACT: Chronic | ICD-10-CM

## 2025-06-03 DIAGNOSIS — S07.1XXS: Chronic | ICD-10-CM

## 2025-06-03 LAB
ALBUMIN SERPL ELPH-MCNC: 4.5 G/DL — SIGNIFICANT CHANGE UP (ref 3.5–5.2)
ALP SERPL-CCNC: 75 U/L — SIGNIFICANT CHANGE UP (ref 30–115)
ALT FLD-CCNC: 16 U/L — SIGNIFICANT CHANGE UP (ref 0–41)
ANION GAP SERPL CALC-SCNC: 14 MMOL/L — SIGNIFICANT CHANGE UP (ref 7–14)
AST SERPL-CCNC: 19 U/L — SIGNIFICANT CHANGE UP (ref 0–41)
BASOPHILS # BLD AUTO: 0.04 K/UL — SIGNIFICANT CHANGE UP (ref 0–0.2)
BASOPHILS NFR BLD AUTO: 0.4 % — SIGNIFICANT CHANGE UP (ref 0–1)
BILIRUB SERPL-MCNC: 0.3 MG/DL — SIGNIFICANT CHANGE UP (ref 0.2–1.2)
BUN SERPL-MCNC: 14 MG/DL — SIGNIFICANT CHANGE UP (ref 10–20)
CALCIUM SERPL-MCNC: 9.1 MG/DL — SIGNIFICANT CHANGE UP (ref 8.4–10.5)
CHLORIDE SERPL-SCNC: 105 MMOL/L — SIGNIFICANT CHANGE UP (ref 98–110)
CO2 SERPL-SCNC: 23 MMOL/L — SIGNIFICANT CHANGE UP (ref 17–32)
CREAT SERPL-MCNC: 0.6 MG/DL — LOW (ref 0.7–1.5)
EGFR: 96 ML/MIN/1.73M2 — SIGNIFICANT CHANGE UP
EGFR: 96 ML/MIN/1.73M2 — SIGNIFICANT CHANGE UP
EOSINOPHIL # BLD AUTO: 0.21 K/UL — SIGNIFICANT CHANGE UP (ref 0–0.7)
EOSINOPHIL NFR BLD AUTO: 2.3 % — SIGNIFICANT CHANGE UP (ref 0–8)
GLUCOSE SERPL-MCNC: 73 MG/DL — SIGNIFICANT CHANGE UP (ref 70–99)
HCT VFR BLD CALC: 44.3 % — SIGNIFICANT CHANGE UP (ref 37–47)
HGB BLD-MCNC: 15.3 G/DL — SIGNIFICANT CHANGE UP (ref 12–16)
IMM GRANULOCYTES NFR BLD AUTO: 0.2 % — SIGNIFICANT CHANGE UP (ref 0.1–0.3)
LYMPHOCYTES # BLD AUTO: 2.49 K/UL — SIGNIFICANT CHANGE UP (ref 1.2–3.4)
LYMPHOCYTES # BLD AUTO: 26.9 % — SIGNIFICANT CHANGE UP (ref 20.5–51.1)
MAGNESIUM SERPL-MCNC: 2.1 MG/DL — SIGNIFICANT CHANGE UP (ref 1.8–2.4)
MCHC RBC-ENTMCNC: 34.5 G/DL — SIGNIFICANT CHANGE UP (ref 32–37)
MCHC RBC-ENTMCNC: 35.3 PG — HIGH (ref 27–31)
MCV RBC AUTO: 102.1 FL — HIGH (ref 81–99)
MONOCYTES # BLD AUTO: 0.68 K/UL — HIGH (ref 0.1–0.6)
MONOCYTES NFR BLD AUTO: 7.4 % — SIGNIFICANT CHANGE UP (ref 1.7–9.3)
NEUTROPHILS # BLD AUTO: 5.8 K/UL — SIGNIFICANT CHANGE UP (ref 1.4–6.5)
NEUTROPHILS NFR BLD AUTO: 62.8 % — SIGNIFICANT CHANGE UP (ref 42.2–75.2)
NRBC BLD AUTO-RTO: 0 /100 WBCS — SIGNIFICANT CHANGE UP (ref 0–0)
PLATELET # BLD AUTO: 223 K/UL — SIGNIFICANT CHANGE UP (ref 130–400)
PMV BLD: 9.7 FL — SIGNIFICANT CHANGE UP (ref 7.4–10.4)
POTASSIUM SERPL-MCNC: 4.4 MMOL/L — SIGNIFICANT CHANGE UP (ref 3.5–5)
POTASSIUM SERPL-SCNC: 4.4 MMOL/L — SIGNIFICANT CHANGE UP (ref 3.5–5)
PROT SERPL-MCNC: 6.9 G/DL — SIGNIFICANT CHANGE UP (ref 6–8)
RBC # BLD: 4.34 M/UL — SIGNIFICANT CHANGE UP (ref 4.2–5.4)
RBC # FLD: 12.4 % — SIGNIFICANT CHANGE UP (ref 11.5–14.5)
SODIUM SERPL-SCNC: 142 MMOL/L — SIGNIFICANT CHANGE UP (ref 135–146)
WBC # BLD: 9.24 K/UL — SIGNIFICANT CHANGE UP (ref 4.8–10.8)
WBC # FLD AUTO: 9.24 K/UL — SIGNIFICANT CHANGE UP (ref 4.8–10.8)

## 2025-06-03 PROCEDURE — 99221 1ST HOSP IP/OBS SF/LOW 40: CPT

## 2025-06-03 PROCEDURE — 80177 DRUG SCRN QUAN LEVETIRACETAM: CPT

## 2025-06-03 PROCEDURE — 36415 COLL VENOUS BLD VENIPUNCTURE: CPT

## 2025-06-03 PROCEDURE — 99222 1ST HOSP IP/OBS MODERATE 55: CPT

## 2025-06-03 PROCEDURE — 85025 COMPLETE CBC W/AUTO DIFF WBC: CPT

## 2025-06-03 PROCEDURE — 95700 EEG CONT REC W/VID EEG TECH: CPT

## 2025-06-03 PROCEDURE — 95716 VEEG EA 12-26HR CONT MNTR: CPT

## 2025-06-03 PROCEDURE — 83735 ASSAY OF MAGNESIUM: CPT

## 2025-06-03 PROCEDURE — 80053 COMPREHEN METABOLIC PANEL: CPT

## 2025-06-03 RX ORDER — MELATONIN 5 MG
10 TABLET ORAL AT BEDTIME
Refills: 0 | Status: DISCONTINUED | OUTPATIENT
Start: 2025-06-03 | End: 2025-06-04

## 2025-06-03 RX ORDER — ONDANSETRON HCL/PF 4 MG/2 ML
4 VIAL (ML) INJECTION EVERY 8 HOURS
Refills: 0 | Status: DISCONTINUED | OUTPATIENT
Start: 2025-06-03 | End: 2025-06-04

## 2025-06-03 RX ORDER — LORAZEPAM 4 MG/ML
2 VIAL (ML) INJECTION THREE TIMES A DAY
Refills: 0 | Status: DISCONTINUED | OUTPATIENT
Start: 2025-06-03 | End: 2025-06-04

## 2025-06-03 RX ORDER — LEVETIRACETAM 10 MG/ML
1000 INJECTION, SOLUTION INTRAVENOUS EVERY 12 HOURS
Refills: 0 | Status: DISCONTINUED | OUTPATIENT
Start: 2025-06-03 | End: 2025-06-04

## 2025-06-03 RX ORDER — ACETAMINOPHEN 500 MG/5ML
650 LIQUID (ML) ORAL EVERY 6 HOURS
Refills: 0 | Status: DISCONTINUED | OUTPATIENT
Start: 2025-06-03 | End: 2025-06-04

## 2025-06-03 RX ORDER — LEVETIRACETAM 10 MG/ML
1000 INJECTION, SOLUTION INTRAVENOUS
Refills: 0 | Status: DISCONTINUED | OUTPATIENT
Start: 2025-06-03 | End: 2025-06-03

## 2025-06-03 RX ORDER — MAGNESIUM, ALUMINUM HYDROXIDE 200-200 MG
30 TABLET,CHEWABLE ORAL EVERY 4 HOURS
Refills: 0 | Status: DISCONTINUED | OUTPATIENT
Start: 2025-06-03 | End: 2025-06-04

## 2025-06-03 RX ORDER — PYRIDOXINE HCL (VITAMIN B6) 25 MG
100 TABLET ORAL DAILY
Refills: 0 | Status: DISCONTINUED | OUTPATIENT
Start: 2025-06-03 | End: 2025-06-04

## 2025-06-03 RX ADMIN — Medication 10 MILLIGRAM(S): at 23:20

## 2025-06-03 RX ADMIN — LEVETIRACETAM 1000 MILLIGRAM(S): 10 INJECTION, SOLUTION INTRAVENOUS at 21:22

## 2025-06-03 NOTE — H&P ADULT - ASSESSMENT
Patient is a 70yo F w/ pmhx of seizure disorder, ETOH abuse (quit 4 years ago), presenting today for a scheduled VEEG. Reports last seizure about 3 years ago, doesn't have much recollection of it, following neurologist Dr. Leavitt who recommended coming in for a VEEG. Currently on Keppra 1000mg BID and compliant with medication. Has no acute complaints at time. Denies fever, chills, n/v, chest pain, SOB, abdominal pain, changes in BMs, urinary sxs.      #Hx of seizure disorder  #Scheduled VEEG  - resume home Keppra 100mg BID  - Above AED serum level  - Ativan 2mg IV push prn for status epilepticus  - Seizure precaution  - Keep Mg > 2   - Neurology consult     Diet: Regular   Activity: AAT   VTE PPX: SCDs  Dispo: from home, VEEG, pending neuro clearance    Plan Discussed and approved by attending on call.

## 2025-06-03 NOTE — H&P ADULT - NS ATTEND AMEND GEN_ALL_CORE FT
discussed with neuro - Dr Cevallos  check cbc, cmp, AED levels  seizure precautions  medication mangement per neuro - follow up recs in AM

## 2025-06-03 NOTE — H&P ADULT - HISTORY OF PRESENT ILLNESS
Patient is a 70yo F w/ pmhx of seizure disorder, ETOH abuse (quit 4 years ago), presenting today for a scheduled VEEG. Reports last seizure about 3 years ago, doesn't have much recollection of it, following neurologist Dr. Leavitt who recommended coming in for a VEEG. Currently on Keppra 1000mg BID and compliant with medication. Has no acute complaints at time. Denies fever, chills, n/v, chest pain, SOB, abdominal pain, changes in BMs, urinary sxs.

## 2025-06-03 NOTE — CHART NOTE - NSCHARTNOTEFT_GEN_A_CORE
Patient just gave RN medication bottles which besides Keppra, includes Eszopiclone 3 mg and melatonin 12 mg. Since she is on EEG will avoid ordering Lunesta but will order Melatonin for sleep Patient just gave RN medication bottles which besides Keppra, includes Eszopiclone 3 mg and melatonin 12 mg. Since she is on EEG will avoid ordering Lunesta but will order Melatonin for sleep (I added to outpatient listing)

## 2025-06-03 NOTE — H&P ADULT - NSHPSOCIALHISTORY_GEN_ALL_CORE
Reports hx of ETOH abuse however stopped about 3 years ago, now occasional ETOH use   Denies any other drug use  Resides at home w. family  Independent at baseline

## 2025-06-03 NOTE — CONSULT NOTE ADULT - SUBJECTIVE AND OBJECTIVE BOX
Neurology/Epilepsy Consult:    CORIE CHOI 69y Female  MRN-[id number]    Patient is a 69y old  Female who presents with a chief complaint of       HPI: History obtained from patient, outpatient records and EMR reviewed.  Patient had 1st seizure in 2017. At that time she was admitted with left shoulder pain and rhabdomyolysis s/p seizure witnessed by spouse. Patient admitted to using cocaine a few days earlier, was not started on AED. In 2017 patient was found unresponsive at home, then had a seizure witnessed by EMT. Patient was intubated, admitted to ICU with rhabdomyolysis and aspiration pneumonia, started on Keppra. Patient denies any other events. Reports being compliant with Keppra, but c/o hair loss and significant bloating in the last year. Patient also wants to drive.  Patient c/o difficulty falling asleep/staying asleep. Was tried on Ambien but did not find it  effective. Patient uses Xanax 2mg at times, last use 2 days ago (not prescribed). States used Xanax 6 times in the last 3-4 weeks. Also, patient tripped and fell last week, and had left foot pain. Was using Vicodin as needed, last use 2 days ago (not prescribed). Patient reports last use of ETOH 2 days ago (2-3 glasses of wine). Patient has h/o alcohol abuse in the past. Lately uses ETOH rarely, and denies having more than 2 drinks every few weeks.     PAST MEDICAL & SURGICAL HISTORY:  Fall with skull fracture   Seizure disorder  HTN  Osteopenia  RA  TOMMY  H/o hip surgery, right  H/o Breast augmentation  H/o appendectomy          FAMILY HISTORY:  No pertinent family history in first degree relatives          Social History:        Risk factors:  Born full-term, , no complications  Normal growth and development  No febrile seizures/CNS infections  No head trauma with loss of consciousness        Allergy:  No Known Allergies        Home Medications:  Keppra 1000 mg oral tablet: 1 tab(s) orally every 12 hours   Modafinil 100mg daily (not taking regularly)  Vitamin B6 100mg daily  Lunesta 3mg QHS PRN  Klonopin 0.25mg PRN   Percocet 5/325 PRN        MEDICATIONS  (STANDING):  chlorhexidine 2% Cloths 1 Application(s) Topical <User Schedule>  levETIRAcetam 1000 milliGRAM(s) Oral every 12 hours  pyridoxine 100 milliGRAM(s) Oral daily    MEDICATIONS  (PRN):  acetaminophen     Tablet .. 650 milliGRAM(s) Oral every 6 hours PRN Temp greater or equal to 38C (100.4F), Mild Pain (1 - 3)  aluminum hydroxide/magnesium hydroxide/simethicone Suspension 30 milliLiter(s) Oral every 4 hours PRN Dyspepsia  LORazepam   Injectable 2 milliGRAM(s) IV Push three times a day PRN generalized tonic-clonic seizure lasting longer than 2 minutes, or two consecutive seizures without return to baseline in-between  ondansetron Injectable 4 milliGRAM(s) IV Push every 8 hours PRN Nausea and/or Vomiting            T(F): --  HR: --  BP: --  RR: --  SpO2: --        Neurologic Examination:  General:  Appearance is consistent with chronologic age.  No abnormal facies. Slightly fidgety  General: The patient is oriented to person, place, time and date.  Follows 3-step directions. Fund of knowledge is intact and normal.  Language with normal repetition, comprehension and naming.  Nondysarthric.    Cranial nerves: EOMI w/o nystagmus, skew or reported double vision.  PERRL.  No ptosis/weakness of eyelid closure.  Facial sensation is normal with normal bite.  No facial asymmetry.  Hearing grossly intact b/l.  Palate elevates midline.  Tongue midline.  Motor examination:   Normal tone, bulk and range of motion.  No tenderness, twitching, tremors or involuntary movements.  Formal Muscle Strength Testin/5 UE; 5/5 LE.  No observable drift.  Reflexes:   2+ b/l   Sensory examination:   Intact to light touch and pinprick, pain  Cerebellum:   FTN/HKS intact with normal JENAE in all limbs.  No dysmetria or dysdiadokinesia.  Gait narrow based and normal.        Labs:   None          Neuroimaging:  CT Head:     < from: CT Head No Cont (19 @ 22:48) >  Findings:      Cortical sulci and ventricular pattern are normal for patient's age.     Adrian-white differentiation is maintained.    There is no acute intracranial hemorrhage, extra-axial fluid collection   or midline shift.      No acute osseous abnormality/depressed skull fracture is identified.     Left maxillary sinus disease.  The remainder of the visualized paranasal sinuses and mastoids are   well-aerated.    Impression:     No CT evidence for acute intracranial pathology.     < end of copied text >      MRI Brain 2020 at [facility] - minimal periventricular WM capping. Stable perhaps chronic ischemic changes within mid right and anterior left corona radiata.           REEG 2025 - normal  REEG 10/5/2018 - mild to moderate bilateral focal slowing with shifting asymmetry L>R, rare left temporal sharp waves that are probably epileptiform  VEEG  - 2018 - bilateral L>>R focal slowing, left hemispheric sharp waves that appear epileptiform in nature  AEE2017 - small number of bilateral independent R>L mid-Temporal spikes and after going slow waves  VEEG x 48hrs 2017 - mild right hemispheric focal slowing, mainly over F-T region  VEEG x 48hrs 2017 borderline to mild L>R anterior quadrant focal slowing          Assessment:  This is a 69y Female with h/o TBI with skull Fx, seizure disorder, osteopenia, RA, remains clinically seizure-free for over 5years.         Discussed with Dr. Cevallos        Plan:   - VEEG monitoring for better characterization and treatment plan  - Seizure precautions  - Continue home dose of Keppra 1000mg q12hrs (ordered0  - Ativan 2mg IV PRN for generalized tonic-clonic seizure lasting longer than 2 minutes, or two consecutive seizures without return to baseline in-between (ordered)  - CBC, CMP, Mg, Keppra level trough (ordered)  - Keep Mg above 2    Plan discussed with patient in details, all questions answered.    Discussed with nursing team, hospitalist, and PA. Neurology/Epilepsy Consult:    CORIE CHOI 69y Female  MRN-[id number]    Patient is a 69y old  Female who presents with a chief complaint of       HPI: History obtained from patient. I-STOP, outpatient records and EMR reviewed.  Patient had 1st seizure in 2017 witnessed by spouse, was admitted with left shoulder pain and rhabdomyolysis. Patient admitted to using cocaine a few days earlier, was not started on ASM.   In 2017 patient was found unresponsive at home, then had a seizure witnessed by EMT. Patient was intubated, admitted to ICU with rhabdomyolysis and aspiration pneumonia, started on Keppra that she continues taking.  Patient denies any other events. Reports being compliant with Keppra, tolerating well  Patient c/o difficulty falling asleep/staying asleep and excessive sleepiness during the day. She is prescribed Lunesta sleep and Modafinil, but reports not taking either regularly. States does the best if takes Klonopin for sleep and Percocet for pain, but gets "very few pill per month." Per I-STOP, patient received 20 pills of Percocet from [facility]25 and it lasted her 5 days. She also filled Rx fro Klonopin 0.25mg   Patient has h/o heavy alcohol use in the past. Lately drinks not every day, and denies having more than 2 drinks a day (wine or liquor). Reports last alcohol intake yesterday (2-3 glasses of wine).         PAST MEDICAL & SURGICAL HISTORY:  Fall with skull fracture   Seizure disorder  HTN  Osteopenia  RA  TOMMY  H/o hip surgery, right  H/o Breast augmentation  H/o appendectomy          FAMILY HISTORY:  No pertinent family history in first degree relatives          Social History:        Risk factors:  Born full-term, , no complications  Normal growth and development  No febrile seizures/CNS infections  No head trauma with loss of consciousness        Allergy:  No Known Allergies        Home Medications:  Keppra 1000 mg oral tablet: 1 tab(s) orally every 12 hours   Modafinil 100mg daily (not taking regularly)  Vitamin B6 100mg daily  Lunesta 3mg QHS PRN  Klonopin 0.25mg PRN   Percocet 5/325 PRN        MEDICATIONS  (STANDING):  chlorhexidine 2% Cloths 1 Application(s) Topical <User Schedule>  levETIRAcetam 1000 milliGRAM(s) Oral every 12 hours  pyridoxine 100 milliGRAM(s) Oral daily    MEDICATIONS  (PRN):  acetaminophen     Tablet .. 650 milliGRAM(s) Oral every 6 hours PRN Temp greater or equal to 38C (100.4F), Mild Pain (1 - 3)  aluminum hydroxide/magnesium hydroxide/simethicone Suspension 30 milliLiter(s) Oral every 4 hours PRN Dyspepsia  LORazepam   Injectable 2 milliGRAM(s) IV Push three times a day PRN generalized tonic-clonic seizure lasting longer than 2 minutes, or two consecutive seizures without return to baseline in-between  ondansetron Injectable 4 milliGRAM(s) IV Push every 8 hours PRN Nausea and/or Vomiting            T(F): --  HR: --  BP: --  RR: --  SpO2: --        Neurologic Examination:  General:  Appearance is consistent with chronologic age.  No abnormal facies. Slightly fidgety  General: The patient is oriented to person, place, time and date.  Follows 3-step directions. Fund of knowledge is intact and normal.  Language with normal repetition, comprehension and naming.  Nondysarthric.    Cranial nerves: EOMI w/o nystagmus, skew or reported double vision.  PERRL.  No ptosis/weakness of eyelid closure.  Facial sensation is normal with normal bite.  No facial asymmetry.  Hearing grossly intact b/l.  Palate elevates midline.  Tongue midline.  Motor examination:   Normal tone, bulk and range of motion.  No tenderness, twitching, tremors or involuntary movements.  Formal Muscle Strength Testin/5 UE; 5/5 LE.  No observable drift.  Reflexes:   2+ b/l   Sensory examination:   Intact to light touch and pinprick, pain  Cerebellum:   FTN/HKS intact with normal JENAE in all limbs.  No dysmetria or dysdiadokinesia.  Gait narrow based and normal.        Labs:   None          Neuroimaging:  CT Head:     < from: CT Head No Cont (19 @ 22:48) >  Findings:      Cortical sulci and ventricular pattern are normal for patient's age.     Adrian-white differentiation is maintained.    There is no acute intracranial hemorrhage, extra-axial fluid collection   or midline shift.      No acute osseous abnormality/depressed skull fracture is identified.     Left maxillary sinus disease.  The remainder of the visualized paranasal sinuses and mastoids are   well-aerated.    Impression:     No CT evidence for acute intracranial pathology.     < end of copied text >      MRI Brain 2020 at [facility] - minimal periventricular WM capping. Stable perhaps chronic ischemic changes within mid right and anterior left corona radiata.           REEG 2025 - normal  REEG 10/5/2018 - mild to moderate bilateral focal slowing with shifting asymmetry L>R, rare left temporal sharp waves that are probably epileptiform  VEEG  - 2018 - bilateral L>>R focal slowing, left hemispheric sharp waves that appear epileptiform in nature  AEE2017 - small number of bilateral independent R>L mid-Temporal spikes and after going slow waves  VEEG x 48hrs 2017 - mild right hemispheric focal slowing, mainly over F-T region  VEEG x 48hrs 2017 borderline to mild L>R anterior quadrant focal slowing          Assessment:  This is a 69y Female with h/o TBI with skull Fx, seizure disorder, osteopenia, RA, remains clinically seizure-free for over 5years.         Discussed with Dr. Cevallos        Plan:   - VEEG monitoring for better characterization and treatment plan  - Seizure precautions  - Continue home dose of Keppra 1000mg q12hrs (ordered0  - Ativan 2mg IV PRN for generalized tonic-clonic seizure lasting longer than 2 minutes, or two consecutive seizures without return to baseline in-between (ordered)  - CBC, CMP, Mg, Keppra level trough (ordered)  - Keep Mg above 2    Plan discussed with patient in details, all questions answered.    Discussed with nursing team, hospitalist, and PA. Neurology/Epilepsy Consult:    CORIE CHOI 69y Female  MRN-[id number]    Patient is a 69y old  Female who presents with a chief complaint of       HPI: History obtained from patient. I-STOP, outpatient records and EMR reviewed.  Patient had 1st seizure in 2017 witnessed by spouse, was admitted with left shoulder pain and rhabdomyolysis. Patient admitted to using cocaine a few days earlier, was not started on ASM.   In 2017 patient was found unresponsive at home, then had a seizure witnessed by EMT. Patient was intubated, admitted to ICU with rhabdomyolysis and aspiration pneumonia, started on Keppra. Reports being compliant with Keppra, tolerating well.  It is not very clear when patient had most recent seizure, but she reports being seizure free for at least 5 years.  Patient was followed by Dr. Nicolas Rogers in the past, but not for several years after moving to [place]. She was seen in the office earlier this year since needed a clearance for surgery, recommended EMU admission.  Patient c/o difficulty falling asleep/staying asleep and excessive sleepiness during the day. She is prescribed Lunesta and Modafinil, but reports not taking either regularly. States does the best if takes Klonopin for sleep and Percocet for pain, but gets "very few pill per month." Per I-STOP, patient received 20 pills of [facility] and it lasted her 5 days. She most recently filled Rx for Klonopin 0.25mg 2025 ( 15 pills), has none left.  Patient has h/o heavy alcohol use in the past. Lately drinks not every day, and denies having more than 2 drinks a day (wine or liquor). Reports last alcohol intake yesterday (2-3 glasses of wine).     Previous trials: Trileptal        PAST MEDICAL & SURGICAL HISTORY:  Fall with skull fracture   Seizure disorder  HTN  Osteopenia  RA  TOMMY  H/o hip surgery, right  H/o Breast augmentation  H/o appendectomy          FAMILY HISTORY:  No pertinent family history in first degree relatives        Social History:  , 2 children  Retired  Not a smoker  ETOH 2-3 drinks several times/week        Allergy:  No Known Allergies        Home Medications:  Keppra 1000 mg oral tablet: 1 tab(s) orally every 12 hours   Modafinil 100mg daily (not taking regularly)  Vitamin B6 100mg daily  Lunesta 3mg QHS PRN  Klonopin 0.25mg PRN   Percocet 5/325 PRN        MEDICATIONS  (STANDING):  chlorhexidine 2% Cloths 1 Application(s) Topical <User Schedule>  levETIRAcetam 1000 milliGRAM(s) Oral every 12 hours  pyridoxine 100 milliGRAM(s) Oral daily    MEDICATIONS  (PRN):  acetaminophen     Tablet .. 650 milliGRAM(s) Oral every 6 hours PRN Temp greater or equal to 38C (100.4F), Mild Pain (1 - 3)  aluminum hydroxide/magnesium hydroxide/simethicone Suspension 30 milliLiter(s) Oral every 4 hours PRN Dyspepsia  LORazepam   Injectable 2 milliGRAM(s) IV Push three times a day PRN generalized tonic-clonic seizure lasting longer than 2 minutes, or two consecutive seizures without return to baseline in-between  ondansetron Injectable 4 milliGRAM(s) IV Push every 8 hours PRN Nausea and/or Vomiting          Neurologic Examination:  General:  Appearance is consistent with chronologic age.  No abnormal facies. Slightly fidgety  General: The patient is oriented to person, place, time and date.  Follows 3-step directions. Fund of knowledge is intact and normal.  Language with normal repetition, comprehension and naming.  Nondysarthric.    Cranial nerves: EOMI w/o nystagmus, skew or reported double vision.  PERRL.  No ptosis/weakness of eyelid closure.  Facial sensation is normal with normal bite.  No facial asymmetry.  Hearing grossly intact b/l.  Palate elevates midline.  Tongue midline.  Motor examination:   Normal tone, bulk and range of motion.  No tenderness, twitching, tremors or involuntary movements.  Formal Muscle Strength Testin/5 UE; 5/5 LE.  No observable drift.  Reflexes:   2+ b/l   Sensory examination:   Intact to light touch and pinprick, pain  Cerebellum:   FTN/HKS intact with normal JENAE in all limbs.  No dysmetria or dysdiadokinesia.  Gait narrow based and normal.        Labs:   None          Neuroimaging:  CT Head:     < from: CT Head No Cont (19 @ 22:48) >  Findings:      Cortical sulci and ventricular pattern are normal for patient's age.     Adrian-white differentiation is maintained.    There is no acute intracranial hemorrhage, extra-axial fluid collection   or midline shift.      No acute osseous abnormality/depressed skull fracture is identified.     Left maxillary sinus disease.  The remainder of the visualized paranasal sinuses and mastoids are   well-aerated.    Impression:     No CT evidence for acute intracranial pathology.     < end of copied text >          MRI brain 2025 at [facility] - moderate periventricular WM increased T2 signal consistent with small vessel encephalomalacia. Right mastoiditis.  MRI Brain 2020 at [facility] - minimal periventricular WM capping. Stable perhaps chronic ischemic changes within mid right and anterior left corona radiata.           REEG 2025 - normal  REEG 10/5/2018 - mild to moderate bilateral focal slowing with shifting asymmetry L>R, rare left temporal sharp waves that are probably epileptiform  VEEG  - 2018 - bilateral L>>R focal slowing, left hemispheric sharp waves that appear epileptiform in nature  AEE2017 - small number of bilateral independent R>L mid-Temporal spikes and after going slow waves  VEEG x 48hrs 2017 - mild right hemispheric focal slowing, mainly over F-T region  VEEG x 48hrs 2017 borderline to mild L>R anterior quadrant focal slowing          Assessment:  This is a 69y Female with h/o TBI with skull Fx, seizure disorder, osteopenia, RA, TOMMY, remains clinically seizure-free for over 5 years.        Discussed with Dr. Cevallos        Plan:   - VEEG monitoring for better characterization and treatment plan  - Seizure precautions  - Watch closely for S&S of alcohol withdrawal  - Continue home dose of Keppra 1000mg q12hrs (ordered0  - Ativan 2mg IV PRN for generalized tonic-clonic seizure lasting longer than 2 minutes, or two consecutive seizures without return to baseline in-between (ordered)  - CBC, CMP, Mg, Keppra level trough (ordered)  - Keep Mg above 2  - addiction team evaluation    Plan discussed with patient in details, all questions answered.    Discussed with nursing team, hospitalist, and PA. Neurology/Epilepsy Consult:    CORIE CHOI 69y Female  MRN-[id number]    Patient is a 69y old  Female who presents with a chief complaint of       HPI: History obtained from patient. I-STOP, outpatient records and EMR reviewed.  Patient had 1st seizure in 2017 witnessed by spouse, was admitted with left shoulder pain and rhabdomyolysis. Patient admitted to using cocaine a few days earlier, was not started on ASM.   In 2017 patient was found unresponsive at home, then had a seizure witnessed by EMT. Patient was intubated, admitted to ICU with rhabdomyolysis and aspiration pneumonia, started on Keppra. Reports being compliant with Keppra, tolerating well.  It is not very clear when patient had most recent seizure, but she reports being seizure free for at least 5 years.  Patient was followed by Dr. Nicolas Rogers in the past, but not for several years after moving to [place]. She was seen in the office earlier this year since needed a clearance for surgery, recommended EMU admission.  Patient c/o difficulty falling asleep/staying asleep and excessive sleepiness during the day. She is prescribed Lunesta and Modafinil, but reports not taking either regularly. States does the best if takes Klonopin for sleep and Percocet for pain, but gets "very few pill per month." Per I-STOP, patient received 20 pills of [facility] and it lasted her 5 days. She most recently filled Rx for Klonopin 0.25mg 2025 ( 15 pills), has none left.  Patient has h/o heavy alcohol use in the past. Lately drinks not every day, and denies having more than 2-3 drinks a day (wine or liquor). Reports last alcohol intake yesterday (2-3 glasses of wine).     Previous trials: Trileptal        PAST MEDICAL & SURGICAL HISTORY:  Fall with skull fracture   Seizure disorder  HTN  Osteopenia  RA  TOMMY  H/o hip surgery, right  H/o Breast augmentation  H/o appendectomy          FAMILY HISTORY:  No pertinent family history in first degree relatives        Social History:  , 2 children  Retired  Not a smoker  ETOH 2-3 drinks several times/week        Allergy:  No Known Allergies        Home Medications:  Keppra 1000 mg oral tablet: 1 tab(s) orally every 12 hours   Modafinil 100mg daily (not taking regularly)  Vitamin B6 100mg daily  Lunesta 3mg QHS PRN  Klonopin 0.25mg PRN   Percocet 5/325 PRN        MEDICATIONS  (STANDING):  chlorhexidine 2% Cloths 1 Application(s) Topical <User Schedule>  levETIRAcetam 1000 milliGRAM(s) Oral every 12 hours  pyridoxine 100 milliGRAM(s) Oral daily    MEDICATIONS  (PRN):  acetaminophen     Tablet .. 650 milliGRAM(s) Oral every 6 hours PRN Temp greater or equal to 38C (100.4F), Mild Pain (1 - 3)  aluminum hydroxide/magnesium hydroxide/simethicone Suspension 30 milliLiter(s) Oral every 4 hours PRN Dyspepsia  LORazepam   Injectable 2 milliGRAM(s) IV Push three times a day PRN generalized tonic-clonic seizure lasting longer than 2 minutes, or two consecutive seizures without return to baseline in-between  ondansetron Injectable 4 milliGRAM(s) IV Push every 8 hours PRN Nausea and/or Vomiting          Neurologic Examination:  General:  Appearance is consistent with chronologic age.  No abnormal facies. Slightly fidgety  General: The patient is oriented to person, place, time and date.  Follows 3-step directions. Fund of knowledge is intact and normal.  Language with normal repetition, comprehension and naming.  Nondysarthric.    Cranial nerves: EOMI w/o nystagmus, skew or reported double vision.  PERRL.  No ptosis/weakness of eyelid closure.  Facial sensation is normal with normal bite.  No facial asymmetry.  Hearing grossly intact b/l.  Palate elevates midline.  Tongue midline.  Motor examination:   Normal tone, bulk and range of motion.  No tenderness, twitching, tremors or involuntary movements.  Formal Muscle Strength Testin/5 UE; 5/5 LE.  No observable drift.  Reflexes:   2+ b/l   Sensory examination:   Intact to light touch and pinprick, pain  Cerebellum:   FTN/HKS intact with normal JENAE in all limbs.  No dysmetria or dysdiadokinesia.  Gait narrow based and normal.        Labs:   None          Neuroimaging:  CT Head:     < from: CT Head No Cont (19 @ 22:48) >  Findings:      Cortical sulci and ventricular pattern are normal for patient's age.     Adrian-white differentiation is maintained.    There is no acute intracranial hemorrhage, extra-axial fluid collection   or midline shift.      No acute osseous abnormality/depressed skull fracture is identified.     Left maxillary sinus disease.  The remainder of the visualized paranasal sinuses and mastoids are   well-aerated.    Impression:     No CT evidence for acute intracranial pathology.     < end of copied text >          MRI brain 2025 at [facility] - moderate periventricular WM increased T2 signal consistent with small vessel encephalomalacia. Right mastoiditis.  MRI Brain 2020 at [facility] - minimal periventricular WM capping. Stable perhaps chronic ischemic changes within mid right and anterior left corona radiata.           REEG 2025 - normal  REEG 10/5/2018 - mild to moderate bilateral focal slowing with shifting asymmetry L>R, rare left temporal sharp waves that are probably epileptiform  VEEG  - 2018 - bilateral L>>R focal slowing, left hemispheric sharp waves that appear epileptiform in nature  AEE2017 - small number of bilateral independent R>L mid-Temporal spikes and after going slow waves  VEEG x 48hrs 2017 - mild right hemispheric focal slowing, mainly over F-T region  VEEG x 48hrs 2017 borderline to mild L>R anterior quadrant focal slowing          Assessment:  This is a 69y Female with h/o TBI with skull Fx, seizure disorder, osteopenia, RA, TOMMY, remains clinically seizure-free for over 5 years.        Discussed with Dr. Cevallos        Plan:   - VEEG monitoring for better characterization and treatment plan  - Seizure precautions  - Watch closely for S&S of alcohol withdrawal  - Continue home dose of Keppra 1000mg q12hrs (ordered0  - Ativan 2mg IV PRN for generalized tonic-clonic seizure lasting longer than 2 minutes, or two consecutive seizures without return to baseline in-between (ordered)  - CBC, CMP, Mg, Keppra level trough (ordered)  - Keep Mg above 2  - addiction team evaluation    Plan discussed with patient in details, all questions answered.    Discussed with nursing team, hospitalist, and PA. Neurology/Epilepsy Consult:    OCRIE CHOI 69y Female  MRN-[id number]    Patient is a 69y old  Female who presents with a chief complaint of       HPI: History obtained from patient. I-STOP, outpatient records and EMR reviewed.  Patient had 1st seizure in 2017 witnessed by spouse, was admitted with left shoulder pain and rhabdomyolysis. Patient admitted to using cocaine a few days earlier, was not started on ASM.   In 2017 patient was found unresponsive at home, then had a seizure witnessed by EMT. Patient was intubated, admitted to ICU with rhabdomyolysis and aspiration pneumonia, started on Keppra. Reports being compliant with Keppra, tolerating well.  It is not very clear when patient had most recent seizure, but she reports being seizure free for at least 5 years.  Patient was followed by Dr. Nicolas Rogers in the past, but not for several years after moving to [place]. She was seen in the office earlier this year since needed a clearance for surgery, recommended EMU admission.  Patient c/o difficulty falling asleep/staying asleep and excessive sleepiness during the day. She is prescribed Lunesta and Modafinil, but reports not taking either regularly. States does the best if takes Klonopin for sleep and Percocet for pain, but gets "very few pill per month." Per I-STOP, patient received 20 pills of Percocet from [facility] 2025 and it lasted her 5 days. She most recently filled Rx for Klonopin 0.25mg 2025 ( 15 pills), has none left.  Patient has h/o heavy alcohol use in the past. Lately drinks not every day, and denies having more than 2-3 drinks a day (wine or liquor). Reports last alcohol intake yesterday (2-3 glasses of wine).     Previous trials: Trileptal        PAST MEDICAL & SURGICAL HISTORY:  Fall with skull fracture   Seizure disorder  HTN  Osteopenia  RA  TOMMY  H/o hip surgery, right  H/o Breast augmentation  H/o appendectomy          FAMILY HISTORY:  Several family members with epilepsy        Social History:  , 2 children  Retired  Not a smoker  ETOH 2-3 drinks several times/week        Allergy:  No Known Allergies        Home Medications:  Keppra 1000 mg oral tablet: 1 tab(s) orally every 12 hours   Modafinil 100mg daily (not taking regularly)  Vitamin B6 100mg daily  Lunesta 3mg QHS PRN  Klonopin 0.25mg PRN   Percocet 5/325 PRN        MEDICATIONS  (STANDING):  chlorhexidine 2% Cloths 1 Application(s) Topical <User Schedule>  levETIRAcetam 1000 milliGRAM(s) Oral every 12 hours  pyridoxine 100 milliGRAM(s) Oral daily    MEDICATIONS  (PRN):  acetaminophen     Tablet .. 650 milliGRAM(s) Oral every 6 hours PRN Temp greater or equal to 38C (100.4F), Mild Pain (1 - 3)  aluminum hydroxide/magnesium hydroxide/simethicone Suspension 30 milliLiter(s) Oral every 4 hours PRN Dyspepsia  LORazepam   Injectable 2 milliGRAM(s) IV Push three times a day PRN generalized tonic-clonic seizure lasting longer than 2 minutes, or two consecutive seizures without return to baseline in-between  ondansetron Injectable 4 milliGRAM(s) IV Push every 8 hours PRN Nausea and/or Vomiting          Neurologic Examination:  General:  Appearance is consistent with chronologic age.  No abnormal facies. Slightly fidgety  General: The patient is oriented to person, place, time and date.  Follows 3-step directions. Fund of knowledge is intact and normal.  Language with normal repetition, comprehension and naming.  Nondysarthric.    Cranial nerves: EOMI w/o nystagmus, skew or reported double vision.  PERRL.  No ptosis/weakness of eyelid closure.  Facial sensation is normal with normal bite.  No facial asymmetry.  Hearing grossly intact b/l.  Palate elevates midline.  Tongue midline.  Motor examination:   Normal tone, bulk and range of motion.  No tenderness, twitching, tremors or involuntary movements.  Formal Muscle Strength Testin/5 UE; 5/5 LE.  No observable drift.  Reflexes:   2+ b/l   Sensory examination:   Intact to light touch and pinprick, pain  Cerebellum:   FTN/HKS intact with normal JENAE in all limbs.  No dysmetria or dysdiadokinesia.  Gait narrow based and normal.        Labs:   None          Neuroimaging:  CT Head:     < from: CT Head No Cont (19 @ 22:48) >  Findings:      Cortical sulci and ventricular pattern are normal for patient's age.     Adrian-white differentiation is maintained.    There is no acute intracranial hemorrhage, extra-axial fluid collection   or midline shift.      No acute osseous abnormality/depressed skull fracture is identified.     Left maxillary sinus disease.  The remainder of the visualized paranasal sinuses and mastoids are   well-aerated.    Impression:     No CT evidence for acute intracranial pathology.     < end of copied text >          MRI brain 2025 at [facility] - moderate periventricular WM increased T2 signal consistent with small vessel encephalomalacia. Right mastoiditis.  MRI Brain 2020 at [facility] - minimal periventricular WM capping. Stable perhaps chronic ischemic changes within mid right and anterior left corona radiata.           REEG 2025 - normal  REEG 10/5/2018 - mild to moderate bilateral focal slowing with shifting asymmetry L>R, rare left temporal sharp waves that are probably epileptiform  VEEG  - 2018 - bilateral L>>R focal slowing, left hemispheric sharp waves that appear epileptiform in nature  AEE2017 - small number of bilateral independent R>L mid-Temporal spikes and after going slow waves  VEEG x 48hrs 2017 - mild right hemispheric focal slowing, mainly over F-T region  VEEG x 48hrs 2017 borderline to mild L>R anterior quadrant focal slowing          Assessment:  This is a 69y Female with h/o TBI with skull Fx, seizure disorder, osteopenia, RA, TOMMY, remains clinically seizure-free for over 5 years.        Discussed with Dr. Cevallos        Plan:   - VEEG monitoring for better characterization and treatment plan  - Seizure precautions  - Watch closely for S&S of alcohol withdrawal  - Continue home dose of Keppra 1000mg q12hrs (ordered0  - Ativan 2mg IV PRN for generalized tonic-clonic seizure lasting longer than 2 minutes, or two consecutive seizures without return to baseline in-between (ordered)  - CBC, CMP, Mg, Keppra level trough (ordered)  - Keep Mg above 2  - addiction team evaluation    Plan discussed with patient in details, all questions answered.    Discussed with nursing team, hospitalist, and PA.

## 2025-06-03 NOTE — H&P ADULT - NSICDXPASTSURGICALHX_GEN_ALL_CORE_FT
PAST SURGICAL HISTORY:  Breast implant status     Crushing injury of skull, sequela     History of appendectomy     Rib fractures

## 2025-06-03 NOTE — H&P ADULT - NSHPPHYSICALEXAM_GEN_ALL_CORE
GENERAL:  70y/o Female NAD, resting comfortably.  HEAD:  Atraumatic  EYES: EOMI, conjunctiva and sclera clear  NECK: Supple  CHEST/LUNG: CTA  HEART: S1&S2  ABDOMEN: Soft, Nontender, Nondistended x 4 quadrants; Bowel sounds present  EXTREMITIES:   Peripheral Pulses Present, No clubbing, no cyanosis, or no edema, no calf tenderness  PSYCH: AAOx3, cooperative, appropriate  NEUROLOGY: WNL  SKIN: WNL

## 2025-06-03 NOTE — CONSULT NOTE ADULT - NS ATTEND AMEND GEN_ALL_CORE FT
Agree with history and physical exam.  Patient does have risk factors for unprovoked seizures, including family history and prior head trauma. Patient does admit to drinking, having difficulty with sleep, and prior drug use.  According to the patient, her last known seizure was years ago.  Will start VEEG monitoring.  Seizure precautions.  Continue home dose of Keppra, send trough level.

## 2025-06-04 ENCOUNTER — TRANSCRIPTION ENCOUNTER (OUTPATIENT)
Age: 70
End: 2025-06-04

## 2025-06-04 DIAGNOSIS — F10.10 ALCOHOL ABUSE, UNCOMPLICATED: ICD-10-CM

## 2025-06-04 PROCEDURE — 99239 HOSP IP/OBS DSCHRG MGMT >30: CPT

## 2025-06-04 PROCEDURE — 99221 1ST HOSP IP/OBS SF/LOW 40: CPT

## 2025-06-04 PROCEDURE — 95720 EEG PHY/QHP EA INCR W/VEEG: CPT

## 2025-06-04 PROCEDURE — 99231 SBSQ HOSP IP/OBS SF/LOW 25: CPT

## 2025-06-04 RX ORDER — FOLIC ACID 1 MG/1
1 TABLET ORAL
Qty: 30 | Refills: 0
Start: 2025-06-04 | End: 2025-07-03

## 2025-06-04 RX ORDER — PYRIDOXINE HCL (VITAMIN B6) 25 MG
1 TABLET ORAL
Qty: 30 | Refills: 2
Start: 2025-06-04 | End: 2025-09-01

## 2025-06-04 RX ORDER — MELATONIN 5 MG
1 TABLET ORAL
Refills: 0 | DISCHARGE

## 2025-06-04 RX ORDER — LEVETIRACETAM 10 MG/ML
1 INJECTION, SOLUTION INTRAVENOUS
Refills: 0 | DISCHARGE

## 2025-06-04 RX ORDER — ESZOPICLONE 2 MG/1
1 TABLET, FILM COATED ORAL
Refills: 0 | DISCHARGE

## 2025-06-04 RX ORDER — LEVETIRACETAM 10 MG/ML
1 INJECTION, SOLUTION INTRAVENOUS
Qty: 60 | Refills: 2
Start: 2025-06-04 | End: 2025-09-01

## 2025-06-04 RX ADMIN — LEVETIRACETAM 1000 MILLIGRAM(S): 10 INJECTION, SOLUTION INTRAVENOUS at 09:01

## 2025-06-04 RX ADMIN — Medication 100 MILLIGRAM(S): at 09:01

## 2025-06-04 NOTE — CONSULT NOTE ADULT - PROBLEM SELECTOR RECOMMENDATION 9
After evaluation at this time pt should be on CIWA protocol for observation of alcohol withdrawal. Low risk secondary to her hx of withdrawals.   Pt should be on Thiamine and Folic acid. Pt will be monitored and supportive care provided.  Obtain UDS if not done already.  Use of Vistaril for anxiety.  Consider adding gabapentin for anxiety standing order and follow up with PMD/Program for continuation of treatment.    Abdominal ultrasound AFP every 6 months for HCC screening  -EGD outpatient for esophageal varices screening   -Follow up with Private GI or our [facility] located at [address] [place] [address]. Phone Number: [phone]  -Alcohol counseling provided   CATCH team involved for aftercare and pt does not want to follow up with aftercare

## 2025-06-04 NOTE — DISCHARGE NOTE PROVIDER - ATTENDING DISCHARGE PHYSICAL EXAMINATION:
PHYSICAL EXAM:  GENERAL: NAD, lying in bed comfortably  HEAD:  Atraumatic, Normocephalic  EYES: EOMI, PERRLA, conjunctiva and sclera clear  ENT: Moist mucous membranes  NECK: Supple, No JVD  CHEST/LUNG: Clear to auscultation bilaterally; No rales, rhonchi, wheezing, or rubs. Unlabored respirations  HEART: Regular rate and rhythm; No murmurs, rubs, or gallops  ABDOMEN: Bowel sounds present; Soft, Nontender, Nondistended.   EXTREMITIES:  2+ Peripheral Pulses, brisk capillary refill. No clubbing, cyanosis, or edema  NERVOUS SYSTEM:  Alert & Oriented X3, speech clear. No deficits   MSK: FROM all 4 extremities, full and equal strength  SKIN: No rashes or lesions

## 2025-06-04 NOTE — DISCHARGE NOTE PROVIDER - NSDCFUSCHEDAPPT_GEN_ALL_CORE_FT
[facility]  NEUROLOGY [address] [facility]  Scheduled Appointment: 07/28/2025

## 2025-06-04 NOTE — DISCHARGE NOTE PROVIDER - CARE PROVIDERS DIRECT ADDRESSES
[email or web address]

## 2025-06-04 NOTE — PROGRESS NOTE ADULT - SUBJECTIVE AND OBJECTIVE BOX
Epilepsy Attending Note:     CORIE CHOI    69y Female  MRN [id number]    Vital Signs Last 24 Hrs  T(C): 36.3 (04 Jun 2025 04:45), Max: 36.7 (03 Jun 2025 14:09)  T(F): 97.4 (04 Jun 2025 04:45), Max: 98.1 (03 Jun 2025 14:09)  HR: 65 (04 Jun 2025 04:45) (65 - 92)  BP: 162/87 (04 Jun 2025 04:45) (120/73 - 162/87)  BP(mean): --  RR: 16 (04 Jun 2025 04:45) (16 - 16)  SpO2: 96% (04 Jun 2025 04:45) (95% - 97%)    Parameters below as of 04 Jun 2025 04:45  Patient On (Oxygen Delivery Method): room air                              15.3   9.24  )-----------( 223      ( 03 Jun 2025 19:37 )             44.3       06-03    142  |  105  |  14  ----------------------------<  73  4.4   |  23  |  0.6[L]    Ca    9.1      03 Jun 2025 19:37  Mg     2.1     06-03    TPro  6.9  /  Alb  4.5  /  TBili  0.3  /  DBili  x   /  AST  19  /  ALT  16  /  AlkPhos  75  06-03      MEDICATIONS  (STANDING):  chlorhexidine 2% Cloths 1 Application(s) Topical <User Schedule>  levETIRAcetam 1000 milliGRAM(s) Oral every 12 hours  pyridoxine 100 milliGRAM(s) Oral daily    MEDICATIONS  (PRN):  acetaminophen     Tablet .. 650 milliGRAM(s) Oral every 6 hours PRN Temp greater or equal to 38C (100.4F), Mild Pain (1 - 3)  aluminum hydroxide/magnesium hydroxide/simethicone Suspension 30 milliLiter(s) Oral every 4 hours PRN Dyspepsia  LORazepam   Injectable 2 milliGRAM(s) IV Push three times a day PRN generalized tonic-clonic seizure lasting longer than 2 minutes, or two consecutive seizures without return to baseline in-between  melatonin 10 milliGRAM(s) Oral at bedtime PRN Insomnia  ondansetron Injectable 4 milliGRAM(s) IV Push every 8 hours PRN Nausea and/or Vomiting            VEEG in the last 24 hours:    Background - continuous, symmetrical, organized, reactive, reaching frequencies in the range of 8-9 Hz superimposed by low voltage fast activity, showing normal sleep patterns.    Focal and generalized slowing - borderline to mild left CT focal slowing    Interictal activity:  1. left FT sharp transients   2. rare left FT sharp waves that appear epileptiform in nature    Events - none    Seizures - none    Impression: Abnormal VEEG as above    Plan -   Will   Epilepsy Attending Note:     CORIE CHOI    69y Female  MRN [id number]    Vital Signs Last 24 Hrs  T(C): 36.3 (04 Jun 2025 04:45), Max: 36.7 (03 Jun 2025 14:09)  T(F): 97.4 (04 Jun 2025 04:45), Max: 98.1 (03 Jun 2025 14:09)  HR: 65 (04 Jun 2025 04:45) (65 - 92)  BP: 162/87 (04 Jun 2025 04:45) (120/73 - 162/87)  BP(mean): --  RR: 16 (04 Jun 2025 04:45) (16 - 16)  SpO2: 96% (04 Jun 2025 04:45) (95% - 97%)    Parameters below as of 04 Jun 2025 04:45  Patient On (Oxygen Delivery Method): room air                              15.3   9.24  )-----------( 223      ( 03 Jun 2025 19:37 )             44.3       06-03    142  |  105  |  14  ----------------------------<  73  4.4   |  23  |  0.6[L]    Ca    9.1      03 Jun 2025 19:37  Mg     2.1     06-03    TPro  6.9  /  Alb  4.5  /  TBili  0.3  /  DBili  x   /  AST  19  /  ALT  16  /  AlkPhos  75  06-03      MEDICATIONS  (STANDING):  chlorhexidine 2% Cloths 1 Application(s) Topical <User Schedule>  levETIRAcetam 1000 milliGRAM(s) Oral every 12 hours  pyridoxine 100 milliGRAM(s) Oral daily    MEDICATIONS  (PRN):  acetaminophen     Tablet .. 650 milliGRAM(s) Oral every 6 hours PRN Temp greater or equal to 38C (100.4F), Mild Pain (1 - 3)  aluminum hydroxide/magnesium hydroxide/simethicone Suspension 30 milliLiter(s) Oral every 4 hours PRN Dyspepsia  LORazepam   Injectable 2 milliGRAM(s) IV Push three times a day PRN generalized tonic-clonic seizure lasting longer than 2 minutes, or two consecutive seizures without return to baseline in-between  melatonin 10 milliGRAM(s) Oral at bedtime PRN Insomnia  ondansetron Injectable 4 milliGRAM(s) IV Push every 8 hours PRN Nausea and/or Vomiting            VEEG in the last 24 hours:    Background - continuous, symmetrical, organized, reactive, reaching frequencies in the range of 8-9 Hz superimposed by low voltage fast activity, showing normal sleep patterns.    Focal and generalized slowing - borderline to mild left CT focal slowing    Interictal activity:  1. left FT sharp transients   2. rare left FT sharp waves that appear epileptiform in nature    Events - none    Seizures - none    Impression: Abnormal VEEG as above. Keppra level pending. Patient does not want to continue monitoring, prefers to go home today.    Plan -   Will discontinue monitoring  Continue same dose of Keppra.  Follow up with Dr. Nicolas Rogers as scheduled  The importance of lifestyle changes reinforced.

## 2025-06-04 NOTE — DISCHARGE NOTE NURSING/CASE MANAGEMENT/SOCIAL WORK - FINANCIAL ASSISTANCE
[facility] provides services at a reduced cost to those who are determined to be eligible through [facility]’s financial assistance program. Information regarding [facility]’s financial assistance program can be found by going to [email or web address] or by calling [phone].

## 2025-06-04 NOTE — PROGRESS NOTE ADULT - SUBJECTIVE AND OBJECTIVE BOX
Epilepsy Team Discharge Note:    VEEG monitoring completed, patient is cleared for discharge from neurology standpoint.    Follow up neurology appointment is scheduled with Dr. Nicolas Murphy  for July 28, 2025 at 9:30 am.    [address] [place] [address], [facility] [address] [address]  [phone]    Discharge seizure medications are:  Keppra 1000mg q12hrs (dose was not changed)    Rx sent to the pharmacy.    Keppra level trough sent, result pending.    Detailed instructions regarding seizure precautions and follow up plan are given to the patient, all questions answered. Reinforced the importance of regular sleep patterns and lifestyle modifications (including limiting alcohol intake). Patient will contact Dr. Nicolas Murphy's office regarding surgical clearance. Patient verbalized understanding.    Discussed with medical and nursing teams.

## 2025-06-04 NOTE — DISCHARGE NOTE PROVIDER - NSDCMRMEDTOKEN_GEN_ALL_CORE_FT
folic acid 1 mg oral tablet: 1 tab(s) orally once a day  Keppra 1000 mg oral tablet: 1 tab(s) orally every 12 hours  thiamine 100 mg oral tablet: 1 tab(s) orally once a day  Vitamin B6 100 mg oral tablet: 1 tab(s) orally once a day

## 2025-06-04 NOTE — DISCHARGE NOTE NURSING/CASE MANAGEMENT/SOCIAL WORK - PATIENT PORTAL LINK FT
You can access the FollowMyHealth Patient Portal offered by [facility] by registering at the following website: [email or web address]. By joining [employer]’s FollowMyHealth portal, you will also be able to view your health information using other applications (apps) compatible with our system.

## 2025-06-04 NOTE — DISCHARGE NOTE PROVIDER - CARE PROVIDER_API CALL
Alan Leavitt  Neurology  [address] [facility], [address]  [place] [address]  Phone: [phone]  Fax: [phone]  Follow Up Time: 2 weeks    Jeff Cevallos  Neurology  [address], [address]  [place] [address]  Phone: [phone]  Fax: [phone]  Follow Up Time: Routine

## 2025-06-04 NOTE — CONSULT NOTE ADULT - SUBJECTIVE AND OBJECTIVE BOX
Patient is a 68yo F w/ pmhx of seizure disorder, ETOH abuse (quit 4 years ago), presenting today for a scheduled VEEG. Reports last seizure about 3 years ago, doesn't have much recollection of it, following neurologist Dr. Leavitt who recommended coming in for a VEEG. Currently on Keppra 1000mg BID and compliant with medication. Has no acute complaints at time. Denies fever, chills, n/v, chest pain, SOB, abdominal pain, changes in BMs, urinary sxs.          Pt interviewed, examined and EMR chart reviewed.  Pt admits to drinking most days secretly without her family knowing becuase she displays bad behavior when drinking. Pt states she drinks most but not all days. She states that she never has any withdrawal symptoms. Pt states when she does drink it is about a pint of liwuor most days. She did drink couple of glasses of wine the night before admission for her EEG admission for evaluation for seizure disorder.    denies Hx of withdrawal   variable periods of sobriety in the past.  Has been in detox before _____yes,   __X___No        REVIEW OF SYSTEMS:    Constitutional: No fever, weight loss or fatigue  ENT:  No difficulty hearing, tinnitus, vertigo; No sinus or throat pain  Neck: No pain or stiffness  Respiratory: No cough, wheezing, chills or hemoptysis  Cardiovascular: No chest pain, palpitations, shortness of breath, dizziness or leg swelling  Gastrointestinal: No abdominal or epigastric pain. No nausea, vomiting or hematemesis; No diarrhea or constipation. No melena or hematochezia.  Neurological: No headaches, memory loss, loss of strength, numbness or tremors  Musculoskeletal: No joint pain or swelling; No muscle, back or extremity pain  Psychiatric: No depression, anxiety, mood swings or difficulty sleeping    MEDICATIONS  (STANDING):  chlorhexidine 2% Cloths 1 Application(s) Topical <User Schedule>  levETIRAcetam 1000 milliGRAM(s) Oral every 12 hours  pyridoxine 100 milliGRAM(s) Oral daily    MEDICATIONS  (PRN):  acetaminophen     Tablet .. 650 milliGRAM(s) Oral every 6 hours PRN Temp greater or equal to 38C (100.4F), Mild Pain (1 - 3)  aluminum hydroxide/magnesium hydroxide/simethicone Suspension 30 milliLiter(s) Oral every 4 hours PRN Dyspepsia  LORazepam   Injectable 2 milliGRAM(s) IV Push three times a day PRN generalized tonic-clonic seizure lasting longer than 2 minutes, or two consecutive seizures without return to baseline in-between  melatonin 10 milliGRAM(s) Oral at bedtime PRN Insomnia  ondansetron Injectable 4 milliGRAM(s) IV Push every 8 hours PRN Nausea and/or Vomiting      Vital Signs Last 24 Hrs  T(C): 36.3 (04 Jun 2025 04:45), Max: 36.7 (03 Jun 2025 14:09)  T(F): 97.4 (04 Jun 2025 04:45), Max: 98.1 (03 Jun 2025 14:09)  HR: 65 (04 Jun 2025 04:45) (65 - 92)  BP: 162/87 (04 Jun 2025 04:45) (120/73 - 162/87)  BP(mean): --  RR: 16 (04 Jun 2025 04:45) (16 - 16)  SpO2: 96% (04 Jun 2025 04:45) (95% - 97%)    Parameters below as of 04 Jun 2025 04:45  Patient On (Oxygen Delivery Method): room air        PHYSICAL EXAM:    Constitutional: NAD,   HEENT: EOMI, Normal Hearing,  Respiratory: CTAB/L  Cardiovascular: RR  Gastrointestinal: soft, NT/ND  Extremities: No peripheral edema  Neurological: A/O x 3, no focal deficits    LABS:                        15.3   9.24  )-----------( 223      ( 03 Jun 2025 19:37 )             44.3     06-03    142  |  105  |  14  ----------------------------<  73  4.4   |  23  |  0.6[L]    Ca    9.1      03 Jun 2025 19:37  Mg     2.1     06-03    TPro  6.9  /  Alb  4.5  /  TBili  0.3  /  DBili  x   /  AST  19  /  ALT  16  /  AlkPhos  75  06-03      Urinalysis Basic - ( 03 Jun 2025 19:37 )    Color: x / Appearance: x / SG: x / pH: x  Gluc: 73 mg/dL / Ketone: x  / Bili: x / Urobili: x   Blood: x / Protein: x / Nitrite: x   Leuk Esterase: x / RBC: x / WBC x   Sq Epi: x / Non Sq Epi: x / Bacteria: x      Drug Screen Urine:  Alcohol Level        RADIOLOGY & ADDITIONAL STUDIES:

## 2025-06-04 NOTE — DISCHARGE NOTE PROVIDER - NSDCCPCAREPLAN_GEN_ALL_CORE_FT
PRINCIPAL DISCHARGE DIAGNOSIS  Diagnosis: Seizure  Assessment and Plan of Treatment: You presented for scheduled VEEGS, neurology team on board. No acute findings on VEEG. Follow up neurology appointment is scheduled with Dr. Nicolas Rogers for July 28, 2025 at 9:30 am. Discharge seizure medications are Keppra 1000mg q12hrs (dose was not changed), Rx sent to the pharmacy.  If any acute symptoms, recommended coming to the ED

## 2025-06-04 NOTE — DISCHARGE NOTE PROVIDER - HOSPITAL COURSE
Patient is a 68yo F w/ pmhx of seizure disorder, ETOH abuse (quit 4 years ago), presenting today for a scheduled VEEG. Reports last seizure about 3 years ago, doesn't have much recollection of it, following neurologist Dr. Leavitt who recommended coming in for a VEEG. Currently on Keppra 1000mg BID and compliant with medication. Has no acute complaints at time. Denies fever, chills, n/v, chest pain, SOB, abdominal pain, changes in BMs, urinary sxs.      #Hx of seizure disorder  #Scheduled VEEG  - resume home Keppra 100mg BID  - Keppra serum leevls   - Ativan 2mg IV push prn for status epilepticus  - Seizure precaution  - Keep Mg > 2   - Neuro recs appreciated, patient cleared for discharge   - Follow up neurology appointment is scheduled with Dr. Nicolas Rogers for July 28, 2025 at 9:30 am.  - Discharge seizure medications are Keppra 1000mg q12hrs (dose was not changed), Rx sent to the pharmacy.    #Alcohol abuse   - CIWA protocol recommended, patient refused at time   - Will send home on thiamine and folic acid      Plan Discussed and approved by attending on call. Patient is a 68yo F w/ pmhx of seizure disorder, ETOH abuse (quit 4 years ago), presenting today for a scheduled VEEG. Reports last seizure about 3 years ago, doesn't have much recollection of it, following neurologist Dr. Leavitt who recommended coming in for a VEEG. Currently on Keppra 1000mg BID and compliant with medication. Has no acute complaints at time. Denies fever, chills, n/v, chest pain, SOB, abdominal pain, changes in BMs, urinary sxs.      #Hx of seizure disorder  #Scheduled VEEG  - resume home Keppra 100mg BID as per neurology  keppra level sent but not resulted, as per neurology she can be dc    - Ativan 2mg IV push prn for status epilepticus  - Seizure precaution  - Keep Mg > 2   - Neuro recs appreciated, patient cleared for discharge   - Follow up neurology appointment is scheduled with Dr. Nicolas Rogers for July 28, 2025 at 9:30 am.  - Discharge seizure medications are Keppra 1000mg q12hrs (dose was not changed), Rx sent to the pharmacy.    #Alcohol abuse   - CIWA protocol recommended, patient refused at time   - Will send home on thiamine and folic acid      Plan Discussed and approved by attending on call.

## 2025-06-04 NOTE — DISCHARGE NOTE PROVIDER - PROVIDER TOKENS
PROVIDER:[TOKEN:[[id number]],FOLLOWUP:[2 weeks]],PROVIDER:[TOKEN:[[id number]],FOLLOWUP:[Routine]]

## 2025-06-05 VITALS
RESPIRATION RATE: 18 BRPM | DIASTOLIC BLOOD PRESSURE: 84 MMHG | TEMPERATURE: 97 F | HEART RATE: 67 BPM | SYSTOLIC BLOOD PRESSURE: 155 MMHG | OXYGEN SATURATION: 98 %

## 2025-06-06 LAB — LEVETIRACETAM SERPL-MCNC: 17.8 UG/ML — SIGNIFICANT CHANGE UP (ref 10–40)

## 2025-06-10 DIAGNOSIS — Z71.41 ALCOHOL ABUSE COUNSELING AND SURVEILLANCE OF ALCOHOLIC: ICD-10-CM

## 2025-06-10 DIAGNOSIS — G40.909 EPILEPSY, UNSPECIFIED, NOT INTRACTABLE, WITHOUT STATUS EPILEPTICUS: ICD-10-CM

## 2025-06-10 DIAGNOSIS — F10.10 ALCOHOL ABUSE, UNCOMPLICATED: ICD-10-CM

## 2025-06-10 DIAGNOSIS — I10 ESSENTIAL (PRIMARY) HYPERTENSION: ICD-10-CM

## 2025-06-10 DIAGNOSIS — G40.901 EPILEPSY, UNSPECIFIED, NOT INTRACTABLE, WITH STATUS EPILEPTICUS: ICD-10-CM

## 2025-06-10 DIAGNOSIS — Z87.820 PERSONAL HISTORY OF TRAUMATIC BRAIN INJURY: ICD-10-CM

## 2025-07-23 ENCOUNTER — APPOINTMENT (OUTPATIENT)
Dept: NEUROLOGY | Facility: CLINIC | Age: 70
End: 2025-07-23

## 2025-07-25 ENCOUNTER — RX RENEWAL (OUTPATIENT)
Age: 70
End: 2025-07-25

## 2025-09-11 ENCOUNTER — RX RENEWAL (OUTPATIENT)
Age: 70
End: 2025-09-11